# Patient Record
Sex: MALE | Race: WHITE | NOT HISPANIC OR LATINO | Employment: OTHER | URBAN - NONMETROPOLITAN AREA
[De-identification: names, ages, dates, MRNs, and addresses within clinical notes are randomized per-mention and may not be internally consistent; named-entity substitution may affect disease eponyms.]

---

## 2017-03-20 PROBLEM — N52.9 ERECTILE DYSFUNCTION: Status: ACTIVE | Noted: 2017-03-20

## 2017-06-05 ENCOUNTER — OFFICE VISIT (OUTPATIENT)
Dept: INTERNAL MEDICINE | Facility: CLINIC | Age: 62
End: 2017-06-05

## 2017-06-05 VITALS
OXYGEN SATURATION: 97 % | HEART RATE: 64 BPM | TEMPERATURE: 98.4 F | DIASTOLIC BLOOD PRESSURE: 86 MMHG | SYSTOLIC BLOOD PRESSURE: 140 MMHG | BODY MASS INDEX: 26.68 KG/M2 | WEIGHT: 197 LBS | HEIGHT: 72 IN

## 2017-06-05 DIAGNOSIS — Z00.00 ROUTINE GENERAL MEDICAL EXAMINATION AT A HEALTH CARE FACILITY: Primary | ICD-10-CM

## 2017-06-05 LAB
ALBUMIN SERPL-MCNC: 4.4 G/DL (ref 3.5–5)
ALBUMIN/GLOB SERPL: 1.6 G/DL (ref 1–2)
ALP SERPL-CCNC: 107 U/L (ref 38–126)
ALT SERPL-CCNC: <6 U/L (ref 13–69)
AST SERPL-CCNC: 36 U/L (ref 15–46)
BILIRUB SERPL-MCNC: 2.2 MG/DL (ref 0.2–1.3)
BUN SERPL-MCNC: 21 MG/DL (ref 7–20)
BUN/CREAT SERPL: 21 (ref 6.3–21.9)
CALCIUM SERPL-MCNC: 9.4 MG/DL (ref 8.4–10.2)
CHLORIDE SERPL-SCNC: 108 MMOL/L (ref 98–107)
CHOLEST SERPL-MCNC: 223 MG/DL (ref 0–199)
CO2 SERPL-SCNC: 25 MMOL/L (ref 26–30)
CREAT SERPL-MCNC: 1 MG/DL (ref 0.6–1.3)
GLOBULIN SER CALC-MCNC: 2.7 GM/DL
GLUCOSE SERPL-MCNC: 95 MG/DL (ref 74–98)
HDLC SERPL-MCNC: 41 MG/DL (ref 40–60)
LDLC SERPL CALC-MCNC: 159 MG/DL (ref 0–99)
POTASSIUM SERPL-SCNC: 4.4 MMOL/L (ref 3.5–5.1)
PROT SERPL-MCNC: 7.1 G/DL (ref 6.3–8.2)
SODIUM SERPL-SCNC: 144 MMOL/L (ref 137–145)
TRIGL SERPL-MCNC: 113 MG/DL
VLDLC SERPL CALC-MCNC: 22.6 MG/DL

## 2017-06-05 PROCEDURE — 99396 PREV VISIT EST AGE 40-64: CPT | Performed by: INTERNAL MEDICINE

## 2017-06-05 RX ORDER — LISINOPRIL 10 MG/1
10 TABLET ORAL DAILY
Qty: 90 TABLET | Refills: 3 | Status: SHIPPED | OUTPATIENT
Start: 2017-06-05 | End: 2018-05-14

## 2017-06-05 NOTE — PROGRESS NOTES
Chief Complaint   Patient presents with   • Follow-up     1 year, HLD, ED       Reji Corbin is a 61 y.o. male and is here for a comprehensive physical exam. The patient reports problems - htn.     History:  Erectile dysfunction    Do you take any herbs or supplements that were not prescribed by a doctor? no  Are you taking calcium supplements? no  Are you taking aspirin daily? yes      Health Habits:  Dental Exam. not up to date - no current plans  Eye Exam. up to date  Exercise: 4 times/week.  Current exercise activities include: yard work    Health Maintenance   Topic Date Due   • TDAP/TD VACCINES (1 - Tdap) 06/07/1976   • HEPATITIS C SCREENING  06/03/2016   • LIPID PANEL  06/03/2017   • INFLUENZA VACCINE  08/01/2017   • COLONOSCOPY  01/01/2021       PMH, PSH, SocHx, FamHx, Allergies, and Medications: Reviewed and updated in the Visit Navigator.     Allergies   Allergen Reactions   • Codeine Sulfate      No past medical history on file.  Past Surgical History:   Procedure Laterality Date   • KNEE SURGERY       Social History     Social History   • Marital status:      Spouse name: N/A   • Number of children: N/A   • Years of education: N/A     Occupational History   • Not on file.     Social History Main Topics   • Smoking status: Never Smoker   • Smokeless tobacco: Never Used   • Alcohol use Not on file   • Drug use: Not on file   • Sexual activity: Not on file     Other Topics Concern   • Not on file     Social History Narrative     No family history on file.    Review of Systems  Review of Systems   Constitutional: Negative.  Negative for activity change, appetite change, fatigue and fever.   HENT: Negative for congestion, ear discharge, ear pain and trouble swallowing.    Eyes: Negative for photophobia and visual disturbance.   Respiratory: Negative for cough and shortness of breath.    Cardiovascular: Negative for chest pain and palpitations.   Gastrointestinal: Negative for abdominal  "distention, abdominal pain, constipation, diarrhea, nausea and vomiting.   Endocrine: Negative.    Genitourinary: Positive for difficulty urinating. Negative for dysuria, hematuria and urgency.   Musculoskeletal: Negative for arthralgias, back pain, joint swelling and myalgias.   Skin: Negative for color change and rash.   Allergic/Immunologic: Negative.    Neurological: Negative for dizziness, weakness, light-headedness and headaches.   Hematological: Negative for adenopathy. Does not bruise/bleed easily.   Psychiatric/Behavioral: Negative for agitation, confusion and dysphoric mood. The patient is not nervous/anxious.        Vitals:    06/05/17 1008   BP: 140/86   Pulse: 64   Temp: 98.4 °F (36.9 °C)   SpO2: 97%       Objective   /86  Pulse 64  Temp 98.4 °F (36.9 °C)  Ht 72\" (182.9 cm)  Wt 197 lb (89.4 kg)  SpO2 97%  BMI 26.72 kg/m2    Physical Exam   Constitutional: He is oriented to person, place, and time. He appears well-developed and well-nourished. No distress.   HENT:   Nose: Nose normal.   Mouth/Throat: Oropharynx is clear and moist.   Eyes: Conjunctivae and EOM are normal. No scleral icterus.   Neck: No tracheal deviation present. No thyromegaly present.   Cardiovascular: Normal rate and regular rhythm.  Exam reveals no friction rub.    No murmur heard.  Pulmonary/Chest: No respiratory distress. He has no wheezes. He has no rales.   Abdominal: Soft. He exhibits no distension and no mass. There is no tenderness. There is no guarding.   Genitourinary: Rectum normal and prostate normal.   Musculoskeletal: Normal range of motion. He exhibits no deformity.   Lymphadenopathy:     He has no cervical adenopathy.   Neurological: He is alert and oriented to person, place, and time. He has normal reflexes. No cranial nerve deficit. Coordination normal.   Skin: Skin is warm and dry. No rash noted. No erythema.   Psychiatric: He has a normal mood and affect. His behavior is normal. Judgment and thought " content normal.        Assessment/Plan   1. Healthy male exam.  2. Patient Counseling: Including but not Limited to the following, when appropriate:  --Nutrition: Stressed importance of moderation in sodium/caffeine intake, saturated fat and cholesterol, caloric balance, sufficient intake of fresh fruits, vegetables, fiber, calcium, iron, and 1 mg of folate supplement per day (for females capable of pregnancy).  --Discussed the issue of   daily use of baby aspirin.  --Exercise: Stressed the importance of regular exercise.   --Substance Abuse: Discussed cessation/primary prevention of tobacco, alcohol, or other drug use; driving or other dangerous activities under the influence; availability of treatment for abuse, as indicated based on social history.    --Sexuality: Discussed sexually transmitted diseases, partner selection, use of condoms, avoidance of unintended pregnancy  and contraceptive alternatives.   --Injury prevention: Discussed safety belts, safety helmets, smoke detector, smoking near bedding or upholstery.   --Dental health: Discussed importance of regular tooth brushing, flossing, and dental visits.  --Immunizations reviewed.  --Discussed benefits of colon cancer screening.      3. Discussed the patient's BMI with him.  The BMI is in the acceptable range  4. No Follow-up on file.  5. Age-appropriate Screening Scheduled  6. There are no Patient Instructions on file for this visit.    Assessment/Plan     There are no diagnoses linked to this encounter.

## 2017-06-06 DIAGNOSIS — Z00.00 ROUTINE GENERAL MEDICAL EXAMINATION AT A HEALTH CARE FACILITY: ICD-10-CM

## 2017-06-08 ENCOUNTER — TELEPHONE (OUTPATIENT)
Dept: INTERNAL MEDICINE | Facility: CLINIC | Age: 62
End: 2017-06-08

## 2017-06-08 NOTE — TELEPHONE ENCOUNTER
----- Message from Johny Mallory MD sent at 6/7/2017 11:21 AM EDT -----  Please inform patient labs are okay.

## 2017-06-09 LAB
PSA SERPL-MCNC: 0.56 NG/ML (ref 0.06–4)
WRITTEN AUTHORIZATION: NORMAL

## 2017-11-02 ENCOUNTER — FLU SHOT (OUTPATIENT)
Dept: INTERNAL MEDICINE | Facility: CLINIC | Age: 62
End: 2017-11-02

## 2017-11-02 DIAGNOSIS — Z23 NEED FOR INFLUENZA VACCINE: ICD-10-CM

## 2017-11-02 PROCEDURE — 90471 IMMUNIZATION ADMIN: CPT | Performed by: INTERNAL MEDICINE

## 2017-11-02 PROCEDURE — 90686 IIV4 VACC NO PRSV 0.5 ML IM: CPT | Performed by: INTERNAL MEDICINE

## 2018-05-14 ENCOUNTER — OFFICE VISIT (OUTPATIENT)
Dept: SURGERY | Facility: CLINIC | Age: 63
End: 2018-05-14

## 2018-05-14 VITALS
OXYGEN SATURATION: 99 % | DIASTOLIC BLOOD PRESSURE: 100 MMHG | SYSTOLIC BLOOD PRESSURE: 168 MMHG | WEIGHT: 194 LBS | HEIGHT: 72 IN | TEMPERATURE: 98.1 F | HEART RATE: 68 BPM | BODY MASS INDEX: 26.28 KG/M2

## 2018-05-14 DIAGNOSIS — K40.90 RIGHT INGUINAL HERNIA: ICD-10-CM

## 2018-05-14 DIAGNOSIS — R10.31 RIGHT INGUINAL PAIN: ICD-10-CM

## 2018-05-14 DIAGNOSIS — IMO0002 MASS: Primary | ICD-10-CM

## 2018-05-14 PROCEDURE — 99244 OFF/OP CNSLTJ NEW/EST MOD 40: CPT | Performed by: SURGERY

## 2018-05-14 NOTE — PROGRESS NOTES
Initiate Treatment: Luxamend cream TID (Trade sized tube given)\\nBactrim DS BID for 10 days\\nHydroxyzine 10mg 1-2 po QHS (may cause sedation) Patient: Reji Corbin    YOB: 1955    Date: 05/14/2018    Primary Care Provider: Johny Mallory MD    Reason for Consultation: Hernia    Chief Complaint   Patient presents with   • Hernia       Subjective .     History of present illness:  I saw the patient in the office today as a consultation for evaluation and treatment of a bulge. He complains of a bulge in his right groin for the past 6 months has gradually gotten bigger. He states it worsens with movement. He complains of an sharp pain in his right groin, radiates into his left groin and worsens with touch. He denies diarrhea and constipation.     The following portions of the patient's history were reviewed and updated as appropriate: allergies, current medications, past family history, past medical history, past social history, past surgical history and problem list.    Review of Systems   Constitutional: Negative for chills, fever and unexpected weight change.   HENT: Negative for trouble swallowing and voice change.    Eyes: Negative for visual disturbance.   Respiratory: Negative for apnea, cough, chest tightness, shortness of breath and wheezing.    Cardiovascular: Negative for chest pain, palpitations and leg swelling.   Gastrointestinal: Negative for abdominal distention, abdominal pain, anal bleeding, blood in stool, constipation, diarrhea, nausea, rectal pain and vomiting.   Endocrine: Negative for cold intolerance and heat intolerance.   Genitourinary: Negative for difficulty urinating, dysuria, flank pain, scrotal swelling and testicular pain.   Musculoskeletal: Negative for back pain, gait problem and joint swelling.   Skin: Negative for color change, rash and wound.        mass   Neurological: Negative for dizziness, syncope, speech difficulty, weakness, numbness and headaches.   Hematological: Negative for adenopathy. Does not bruise/bleed easily.   Psychiatric/Behavioral: Negative for confusion. The patient is not  Detail Level: Simple nervous/anxious.        History:  Past Medical History:   Diagnosis Date   • Hypertension        Past Surgical History:   Procedure Laterality Date   • KNEE SURGERY         Family History   Problem Relation Age of Onset   • No Known Problems Mother    • Stroke Father        Social History   Substance Use Topics   • Smoking status: Never Smoker   • Smokeless tobacco: Never Used   • Alcohol use No       Allergies:  Allergies   Allergen Reactions   • Codeine Sulfate Irritability       Medications:  No current outpatient prescriptions on file.    Objective     Vital Signs:        Physical Exam:   General Appearance:    Alert, cooperative, in no acute distress   Head:    Normocephalic, without obvious abnormality, atraumatic   Eyes:            Lids and lashes normal, conjunctivae and sclerae normal, no   icterus, no pallor, corneas clear, PERRLA   Ears:    Ears appear intact with no abnormalities noted   Throat:   No oral lesions, no thrush, oral mucosa moist   Neck:   No adenopathy, supple, trachea midline, no thyromegaly, no   carotid bruit, no JVD   Lungs:     Clear to auscultation,respirations regular, even and                  unlabored    Heart:    Regular rhythm and normal rate, normal S1 and S2, no            murmur   Abdomen:     no masses, no organomegaly, soft non-tender, non-distended, no guarding, there is evidence of a large reducible right inguinal hernia   Extremities:   Moves all extremities well, no edema, no cyanosis, no             redness   Pulses:   Pulses palpable and equal bilaterally   Skin:   No bleeding, bruising or rash   Lymph nodes:   No palpable adenopathy   Neurologic:   Cranial nerves 2 - 12 grossly intact, sensation intact        Results Review:   I reviewed the patient's new clinical results.  I reviewed the patient's new imaging results and agree with the interpretation.  I reviewed the patient's other test results and agree with the interpretation    Review of Systems was reviewed and  confirmed as accurate today.    Assessment/Plan :    1. Mass    2. Right inguinal pain    3. Right inguinal hernia        I had a detailed and extensive discussion with the patient in the office and they understand that they need to undergo hernia repair with mesh.  Full risks and benefits of operative versus nonoperative intervention were discussed with the patient and these included things such as nonresolution of symptoms and possible worsening of symptoms without surgical intervention versus infection, bleeding, possible recurrent hernia, possible postoperative neuralgia from nerve damage or involvement with scar tissue, etc.  The patient understands, agrees, and had no questions for me at the end of the office visit.     I discussed the patients findings and my recommendations with patient. I am going to see him again next week to check his blood pressure since I have asked him to restart his medications.    Electronically signed by Leonard Jenkins MD  05/16/18    Scribed for Leonard Jenkins MD by Myrna Chiang. 5/16/2018  8:27 AM           Discontinue Regimen: Stop scratching

## 2018-05-21 ENCOUNTER — OFFICE VISIT (OUTPATIENT)
Dept: SURGERY | Facility: CLINIC | Age: 63
End: 2018-05-21

## 2018-05-21 VITALS
WEIGHT: 193 LBS | SYSTOLIC BLOOD PRESSURE: 150 MMHG | BODY MASS INDEX: 26.14 KG/M2 | HEIGHT: 72 IN | TEMPERATURE: 98.8 F | DIASTOLIC BLOOD PRESSURE: 100 MMHG | HEART RATE: 72 BPM | OXYGEN SATURATION: 99 %

## 2018-05-21 DIAGNOSIS — Z12.11 COLON CANCER SCREENING: ICD-10-CM

## 2018-05-21 DIAGNOSIS — K40.90 RIGHT INGUINAL HERNIA: Primary | ICD-10-CM

## 2018-05-21 DIAGNOSIS — I10 HYPERTENSION, UNSPECIFIED TYPE: ICD-10-CM

## 2018-05-21 PROCEDURE — 99213 OFFICE O/P EST LOW 20 MIN: CPT | Performed by: SURGERY

## 2018-05-21 RX ORDER — LISINOPRIL 10 MG/1
20 TABLET ORAL 2 TIMES DAILY
COMMUNITY
End: 2022-06-13 | Stop reason: SDUPTHER

## 2018-05-21 NOTE — PROGRESS NOTES
Patient: Reji Corbin    YOB: 1955    Date: 05/21/2018    Primary Care Provider: Johny Mallory MD    Chief Complaint   Patient presents with   • Colonoscopy     follow up BP       Subjective .     History of present illness:  I saw the patient in the office today as a follow up on hypertension.  Patient had a colonoscopy about 8 years ago.  He denies changes in bowel habits, rectal bleeding or fm hx of colon cancer.  Patient will be scheduled for a RIH repair after the colonoscopy. He does have a history of increased blood pressure and has restarted his previous antihypertensive medications.    The following portions of the patient's history were reviewed and updated as appropriate: allergies, current medications, past family history, past medical history, past social history, past surgical history and problem list.      Review of Systems   Constitutional: Negative for chills, diaphoresis, fatigue and fever.   HENT: Negative for dental problem, drooling, ear discharge and hearing loss.    Respiratory: Negative for cough, choking, chest tightness and shortness of breath.    Cardiovascular: Negative for chest pain, palpitations and leg swelling.   Gastrointestinal: Negative for blood in stool.   Endocrine: Negative for cold intolerance and heat intolerance.   Genitourinary: Negative for flank pain, frequency and hematuria.   Neurological: Negative for seizures, light-headedness, numbness and headaches.   Psychiatric/Behavioral: Negative for agitation, behavioral problems and confusion.       History:  Past Medical History:   Diagnosis Date   • Hypertension        Past Surgical History:   Procedure Laterality Date   • KNEE SURGERY         Family History   Problem Relation Age of Onset   • No Known Problems Mother    • Stroke Father        Social History   Substance Use Topics   • Smoking status: Never Smoker   • Smokeless tobacco: Never Used   • Alcohol use No       Allergies:  Allergies   Allergen  "Reactions   • Codeine Sulfate Irritability       Medications:    Current Outpatient Prescriptions:   •  lisinopril (PRINIVIL,ZESTRIL) 10 MG tablet, Take 10 mg by mouth Daily., Disp: , Rfl:     Objective     Vital Signs:   Vitals:    05/21/18 1016   BP: 150/100   BP Location: Left arm   Patient Position: Sitting   Pulse: 72   Temp: 98.8 °F (37.1 °C)   TempSrc: Temporal Artery    SpO2: 99%   Weight: 87.5 kg (193 lb)   Height: 182.9 cm (72.01\")       Physical Exam:   General Appearance:    Alert, cooperative, in no acute distress   Head:    Normocephalic, without obvious abnormality, atraumatic   Eyes:            Lids and lashes normal, conjunctivae and sclerae normal, no   icterus, no pallor, corneas clear, PERRL   Ears:    Ears appear intact with no abnormalities noted   Throat:   No oral lesions, no thrush, oral mucosa moist   Neck:   No adenopathy, supple, trachea midline, no thyromegaly,  no JVD   Lungs:     Clear to auscultation,respirations regular, even and                  unlabored    Heart:    Regular rhythm and normal rate, normal S1 and S2, no            murmur   Abdomen:     no masses, no organomegaly, soft non-tender, non-distended, no guarding, there is no evidence of tenderness   Extremities:   Moves all extremities well, no edema, no cyanosis, no             redness   Pulses:   Pulses palpable and equal bilaterally   Skin:   No bleeding, bruising or rash   Lymph nodes:   No palpable adenopathy   Neurologic:   Cranial nerves 2 - 12 grossly intact, sensation intact      Results Review:   I reviewed the patient's new clinical results.  I reviewed the patient's new imaging results and agree with the interpretation.  I reviewed the patient's other test results and agree with the interpretation    Review of Systems was reviewed and confirmed as accurate today.    Assessment/Plan :    1. Right inguinal hernia    2. Colon cancer screening    3. Hypertension, unspecified type      I did have a clear and " detailed discussion with the patient in the office today.  He needs to undergo inguinal herniorrhaphy but first needs to have better control of his blood pressure.  I have referred him back to his primary care physician Dr. Johny Mallory.  He also understands that ultimately he will need to undergo screening colonoscopy.  I will see him back on his blood pressure is under better control.    Electronically signed by Leonard Jenkins MD  05/23/18 10:18 AM    Scribed for Leonard Jenkins MD by Katy Irizarry. 5/23/2018  3:10 PM

## 2018-07-25 ENCOUNTER — OFFICE VISIT (OUTPATIENT)
Dept: SURGERY | Facility: CLINIC | Age: 63
End: 2018-07-25

## 2018-07-25 VITALS
WEIGHT: 191 LBS | BODY MASS INDEX: 25.87 KG/M2 | TEMPERATURE: 98.2 F | SYSTOLIC BLOOD PRESSURE: 118 MMHG | HEART RATE: 80 BPM | OXYGEN SATURATION: 98 % | HEIGHT: 72 IN | DIASTOLIC BLOOD PRESSURE: 80 MMHG

## 2018-07-25 DIAGNOSIS — K40.90 NON-RECURRENT UNILATERAL INGUINAL HERNIA WITHOUT OBSTRUCTION OR GANGRENE: Primary | ICD-10-CM

## 2018-07-25 DIAGNOSIS — Z12.11 SCREENING FOR COLON CANCER: ICD-10-CM

## 2018-07-25 PROCEDURE — 99213 OFFICE O/P EST LOW 20 MIN: CPT | Performed by: SURGERY

## 2018-07-25 RX ORDER — BISACODYL 5 MG/1
10 TABLET, DELAYED RELEASE ORAL 2 TIMES DAILY
Qty: 4 TABLET | Refills: 0 | Status: SHIPPED | OUTPATIENT
Start: 2018-07-25 | End: 2018-07-26

## 2018-07-25 RX ORDER — POLYETHYLENE GLYCOL 1450
1 POWDER (GRAM) MISCELLANEOUS
Qty: 238 G | Refills: 0 | Status: SHIPPED | OUTPATIENT
Start: 2018-07-25 | End: 2018-07-25

## 2018-07-25 NOTE — PROGRESS NOTES
Patient: Reji Corbin    YOB: 1955    Date: 07/25/2018    Primary Care Provider: Johny Mallory MD    Chief Complaint   Patient presents with   • Follow-up     right inguinal pain       Subjective .     History of present illness:  I saw the patient in the office today as a consultation for evaluation and treatment of right inguinal pain.  Patient is here today to discuss a colonoscopy/RIH repair.  Patient was to get his blood pressure under control.  He does need to undergo a screening colonoscopy, he does complain of sharp right inguinal discomfort, associated with the mass, present for several months, worse with heavy lifting, relieved by lying down, nonradiating in nature.  He did have his blood pressure addressed recently and his hypertension is markedly better after increasing his level of medications.    The following portions of the patient's history were reviewed and updated as appropriate: allergies, current medications, past family history, past medical history, past social history, past surgical history and problem list.      Review of Systems   Constitutional: Negative for chills, diaphoresis, fatigue and fever.   HENT: Negative for dental problem, drooling, ear discharge and hearing loss.    Respiratory: Negative for cough, choking, chest tightness and shortness of breath.    Cardiovascular: Negative for chest pain, palpitations and leg swelling.   Endocrine: Negative for cold intolerance and heat intolerance.   Genitourinary: Negative for flank pain, frequency and hematuria.   Neurological: Negative for seizures, light-headedness, numbness and headaches.   Psychiatric/Behavioral: Negative for agitation, behavioral problems and confusion.       History:  Past Medical History:   Diagnosis Date   • Hypertension        Past Surgical History:   Procedure Laterality Date   • KNEE SURGERY         Family History   Problem Relation Age of Onset   • No Known Problems Mother    • Stroke  "Father        Social History   Substance Use Topics   • Smoking status: Never Smoker   • Smokeless tobacco: Never Used   • Alcohol use No       Allergies:  Allergies   Allergen Reactions   • Codeine Sulfate Irritability       Medications:    Current Outpatient Prescriptions:   •  bisacodyl (DULCOLAX) 5 MG EC tablet, Take 2 tablets by mouth 2 (Two) Times a Day for 1 day., Disp: 4 tablet, Rfl: 0  •  lisinopril (PRINIVIL,ZESTRIL) 10 MG tablet, Take 10 mg by mouth Daily., Disp: , Rfl:   •  Polyethylene Glycol powder, 1 bottle 1 (One) Time for 1 dose. To be used for bowel prep., Disp: 238 g, Rfl: 0    Objective     Vital Signs:   Vitals:    07/25/18 1252   BP: 118/80   Pulse: 80   Temp: 98.2 °F (36.8 °C)   TempSrc: Temporal Artery    SpO2: 98%   Weight: 86.6 kg (191 lb)   Height: 182.9 cm (72.01\")       Physical Exam:   General Appearance:    Alert, cooperative, in no acute distress   Head:    Normocephalic, without obvious abnormality, atraumatic   Eyes:            Lids and lashes normal, conjunctivae and sclerae normal, no   icterus, no pallor, corneas clear, PERRL   Ears:    Ears appear intact with no abnormalities noted   Throat:   No oral lesions, no thrush, oral mucosa moist   Neck:   No adenopathy, supple, trachea midline, no thyromegaly,  no JVD   Lungs:     Clear to auscultation,respirations regular, even and                  unlabored    Heart:    Regular rhythm and normal rate, normal S1 and S2, no            murmur   Abdomen:     no masses, no organomegaly, soft non-tender, non-distended, no guarding, there is no evidence of tenderness   Extremities:   Moves all extremities well, no edema, no cyanosis, no             redness   Pulses:   Pulses palpable and equal bilaterally   Skin:   No bleeding, bruising or rash   Lymph nodes:   No palpable adenopathy   Neurologic:   Cranial nerves 2 - 12 grossly intact, sensation intact      Results Review:   I reviewed the patient's new clinical results.  I reviewed the " patient's new imaging results and agree with the interpretation.  I reviewed the patient's other test results and agree with the interpretation    Review of Systems was reviewed and confirmed as accurate today.    Assessment/Plan :    1. Non-recurrent unilateral inguinal hernia without obstruction or gangrene    2. Screening for colon cancer        I recommend a colonoscopy for further evaluation. The procedure was explained as well as the risks which include but are not limited to bleeding, infection, perforation, abdominal pain etc. The patient understands these risks and the procedure and wishes to proceed.  He then needs to undergo right inguinal herniorrhaphy.    Electronically signed by Leonard Jenkins MD  07/25/18 10:12 AM

## 2018-07-26 ENCOUNTER — PREP FOR SURGERY (OUTPATIENT)
Dept: OTHER | Facility: HOSPITAL | Age: 63
End: 2018-07-26

## 2018-07-26 DIAGNOSIS — Z12.11 SCREEN FOR COLON CANCER: Primary | ICD-10-CM

## 2018-07-31 PROBLEM — Z12.11 SCREEN FOR COLON CANCER: Status: ACTIVE | Noted: 2018-07-31

## 2018-08-07 ENCOUNTER — TELEPHONE (OUTPATIENT)
Dept: SURGERY | Facility: CLINIC | Age: 63
End: 2018-08-07

## 2018-08-07 RX ORDER — BISACODYL 5 MG/1
5 TABLET, DELAYED RELEASE ORAL
COMMUNITY
Start: 2018-08-08 | End: 2018-08-09

## 2018-08-07 RX ORDER — MULTIPLE VITAMINS W/ MINERALS TAB 9MG-400MCG
1 TAB ORAL DAILY
COMMUNITY
End: 2022-08-22

## 2018-08-07 NOTE — TELEPHONE ENCOUNTER
Left message confirming colonoscopy scheduled 08/09/18 @ [facility](pts wife) called back and confirmed

## 2018-08-09 ENCOUNTER — ANESTHESIA EVENT (OUTPATIENT)
Dept: GASTROENTEROLOGY | Facility: HOSPITAL | Age: 63
End: 2018-08-09

## 2018-08-09 ENCOUNTER — HOSPITAL ENCOUNTER (OUTPATIENT)
Facility: HOSPITAL | Age: 63
Setting detail: HOSPITAL OUTPATIENT SURGERY
Discharge: HOME | End: 2018-08-09
Attending: SURGERY | Admitting: SURGERY

## 2018-08-09 ENCOUNTER — ANESTHESIA (OUTPATIENT)
Dept: GASTROENTEROLOGY | Facility: HOSPITAL | Age: 63
End: 2018-08-09

## 2018-08-09 VITALS
HEART RATE: 58 BPM | BODY MASS INDEX: 25.06 KG/M2 | RESPIRATION RATE: 18 BRPM | DIASTOLIC BLOOD PRESSURE: 80 MMHG | WEIGHT: 185 LBS | HEIGHT: 72 IN | TEMPERATURE: 97.5 F | SYSTOLIC BLOOD PRESSURE: 131 MMHG | OXYGEN SATURATION: 98 %

## 2018-08-09 DIAGNOSIS — Z12.11 SCREEN FOR COLON CANCER: ICD-10-CM

## 2018-08-09 PROCEDURE — 25010000002 PROPOFOL 10 MG/ML EMULSION: Performed by: NURSE ANESTHETIST, CERTIFIED REGISTERED

## 2018-08-09 PROCEDURE — 25010000002 ONDANSETRON PER 1 MG: Performed by: NURSE ANESTHETIST, CERTIFIED REGISTERED

## 2018-08-09 RX ORDER — LIDOCAINE HYDROCHLORIDE 20 MG/ML
INJECTION, SOLUTION INFILTRATION; PERINEURAL AS NEEDED
Status: DISCONTINUED | OUTPATIENT
Start: 2018-08-09 | End: 2018-08-09 | Stop reason: SURG

## 2018-08-09 RX ORDER — ASPIRIN 81 MG/1
81 TABLET ORAL DAILY
COMMUNITY
End: 2022-07-07

## 2018-08-09 RX ORDER — SODIUM CHLORIDE, SODIUM LACTATE, POTASSIUM CHLORIDE, CALCIUM CHLORIDE 600; 310; 30; 20 MG/100ML; MG/100ML; MG/100ML; MG/100ML
1000 INJECTION, SOLUTION INTRAVENOUS CONTINUOUS
Status: DISCONTINUED | OUTPATIENT
Start: 2018-08-09 | End: 2018-08-09 | Stop reason: HOSPADM

## 2018-08-09 RX ORDER — ONDANSETRON 2 MG/ML
INJECTION INTRAMUSCULAR; INTRAVENOUS AS NEEDED
Status: DISCONTINUED | OUTPATIENT
Start: 2018-08-09 | End: 2018-08-09 | Stop reason: SURG

## 2018-08-09 RX ORDER — PROPOFOL 10 MG/ML
VIAL (ML) INTRAVENOUS AS NEEDED
Status: DISCONTINUED | OUTPATIENT
Start: 2018-08-09 | End: 2018-08-09 | Stop reason: SURG

## 2018-08-09 RX ADMIN — ONDANSETRON 4 MG: 2 INJECTION INTRAMUSCULAR; INTRAVENOUS at 10:31

## 2018-08-09 RX ADMIN — PROPOFOL 40 MG: 10 INJECTION, EMULSION INTRAVENOUS at 10:35

## 2018-08-09 RX ADMIN — PROPOFOL 60 MG: 10 INJECTION, EMULSION INTRAVENOUS at 10:31

## 2018-08-09 RX ADMIN — LIDOCAINE HYDROCHLORIDE 80 MG: 20 INJECTION, SOLUTION INFILTRATION; PERINEURAL at 10:31

## 2018-08-09 RX ADMIN — SODIUM CHLORIDE, POTASSIUM CHLORIDE, SODIUM LACTATE AND CALCIUM CHLORIDE: 600; 310; 30; 20 INJECTION, SOLUTION INTRAVENOUS at 10:29

## 2018-08-09 NOTE — ANESTHESIA PREPROCEDURE EVALUATION
Anesthesia Evaluation     Patient summary reviewed and Nursing notes reviewed   no history of anesthetic complications:  NPO Solid Status: > 8 hours  NPO Liquid Status: > 8 hours           Airway   Mallampati: I  TM distance: >3 FB  Neck ROM: full  no difficulty expected  Dental - normal exam     Pulmonary - negative pulmonary ROS and normal exam   Cardiovascular - normal exam    PT is on anticoagulation therapy  Rhythm: regular  Rate: normal    (+) hypertension, hyperlipidemia,       Neuro/Psych- negative ROS  GI/Hepatic/Renal/Endo - negative ROS     Musculoskeletal     (+) back pain,   Abdominal  - normal exam    Abdomen: soft.  Bowel sounds: normal.   Substance History - negative use     OB/GYN negative ob/gyn ROS         Other   (+) arthritis                     Anesthesia Plan    ASA 2     MAC   (Risks and benefits discussed including risk of aspiration, recall and dental damage. All patient questions answered. Will continue with POC.)  intravenous induction   Anesthetic plan and risks discussed with patient.

## 2018-08-09 NOTE — ANESTHESIA POSTPROCEDURE EVALUATION
Patient: Reji Corbin    Procedure Summary     Date:  08/09/18 Room / Location:  [facility] ENDOSCOPY 3 / [facility] ENDOSCOPY    Anesthesia Start:  1029 Anesthesia Stop:      Procedure:  COLONOSCOPY WITH HOT FORCEP POLYPECTOMY (N/A ) Diagnosis:       Screen for colon cancer      (Screen for colon cancer [Z12.11])    Surgeon:  Leonard Jenkins MD Provider:  Mao Marcos CRNA    Anesthesia Type:  MAC ASA Status:  2          Anesthesia Type: MAC  Last vitals  BP   104/57   Temp   97   Pulse   59   Resp   20   SpO2   98     Post Anesthesia Care and Evaluation    Patient location during evaluation: bedside  Patient participation: complete - patient participated  Level of consciousness: awake and alert  Pain score: 0  Pain management: adequate  Airway patency: patent  Anesthetic complications: No anesthetic complications  PONV Status: none  Cardiovascular status: acceptable  Respiratory status: acceptable and nasal cannula  Hydration status: acceptable

## 2018-08-14 LAB
LAB AP CASE REPORT: NORMAL
PATH REPORT.FINAL DX SPEC: NORMAL

## 2018-08-15 ENCOUNTER — OFFICE VISIT (OUTPATIENT)
Dept: SURGERY | Facility: CLINIC | Age: 63
End: 2018-08-15

## 2018-08-15 VITALS
TEMPERATURE: 97.8 F | WEIGHT: 185 LBS | HEART RATE: 62 BPM | SYSTOLIC BLOOD PRESSURE: 130 MMHG | DIASTOLIC BLOOD PRESSURE: 88 MMHG | HEIGHT: 72 IN | BODY MASS INDEX: 25.06 KG/M2 | OXYGEN SATURATION: 99 %

## 2018-08-15 DIAGNOSIS — K63.5 HYPERPLASTIC POLYP OF SIGMOID COLON: Primary | ICD-10-CM

## 2018-08-15 DIAGNOSIS — R10.31 RIGHT LOWER QUADRANT ABDOMINAL PAIN: ICD-10-CM

## 2018-08-15 DIAGNOSIS — K40.90 RIGHT INGUINAL HERNIA: ICD-10-CM

## 2018-08-15 PROCEDURE — 99213 OFFICE O/P EST LOW 20 MIN: CPT | Performed by: SURGERY

## 2018-08-15 NOTE — PROGRESS NOTES
Patient: Reji Corbin    YOB: 1955    Date: 08/15/2018    Primary Care Provider: Magy Wheatley MD    Reason for Consultation: Hernia    Chief Complaint   Patient presents with   • Follow-up       Subjective .     History of present illness:  I saw the patient in the office today as a consultation for a follow up colonoscopy, pathology showed hyperplastic changes and mucosal prolapse related changes.  Patient also has a right inguinal hernia noted on ultrasound, patient will be scheduled accordingly. He complains of right inguinal tenderness.     The patient does have right inguinal discomfort, he states that he does have a mass located in the right scrotal region, this is been present for several months, this is increase is size with heavy lifting, relieved by lying down, nonradiating in nature, not associated with any other symptomatology.    The following portions of the patient's history were reviewed and updated as appropriate: allergies, current medications, past family history, past medical history, past social history, past surgical history and problem list.    Review of Systems   Constitutional: Negative for chills, fever and unexpected weight change.   HENT: Negative for trouble swallowing and voice change.    Eyes: Negative for visual disturbance.   Respiratory: Negative for apnea, cough, chest tightness, shortness of breath and wheezing.    Cardiovascular: Negative for chest pain, palpitations and leg swelling.   Gastrointestinal: Negative for abdominal distention, abdominal pain (right inguinal pain), anal bleeding, blood in stool, constipation, diarrhea, nausea, rectal pain and vomiting.   Endocrine: Negative for cold intolerance and heat intolerance.   Genitourinary: Negative for difficulty urinating, dysuria, flank pain, scrotal swelling and testicular pain.   Musculoskeletal: Negative for back pain, gait problem and joint swelling.   Skin: Negative for color change,  "rash and wound.   Neurological: Negative for dizziness, syncope, speech difficulty, weakness, numbness and headaches.   Hematological: Negative for adenopathy. Does not bruise/bleed easily.   Psychiatric/Behavioral: Negative for confusion. The patient is not nervous/anxious.        History:  Past Medical History:   Diagnosis Date   • Arthritis    • Back pain     herniated disks   • Hard of hearing    • History of stress test 2000    normal   • Hypertension        Past Surgical History:   Procedure Laterality Date   • COLONOSCOPY      2013   • COLONOSCOPY N/A 8/9/2018    Procedure: COLONOSCOPY WITH HOT FORCEP POLYPECTOMY;  Surgeon: Leonard Jenkins MD;  Location: [facility] ENDOSCOPY;  Service: Gastroenterology   • KNEE SURGERY         Family History   Problem Relation Age of Onset   • No Known Problems Mother    • Stroke Father        Social History   Substance Use Topics   • Smoking status: Never Smoker   • Smokeless tobacco: Never Used   • Alcohol use No       Allergies:  Allergies   Allergen Reactions   • Codeine Sulfate Irritability       Medications:    Current Outpatient Prescriptions:   •  aspirin 81 MG EC tablet, Take 81 mg by mouth Daily., Disp: , Rfl:   •  lisinopril (PRINIVIL,ZESTRIL) 10 MG tablet, Take 20 mg by mouth 2 (Two) Times a Day. Takes 20mg of the morning and 20mg at night, Disp: , Rfl:   •  Multiple Vitamins-Minerals (MULTIVITAMIN WITH MINERALS) tablet tablet, Take 1 tablet by mouth Daily., Disp: , Rfl:     Objective     Vital Signs:   Vitals:    08/15/18 1444   BP: 130/88   Pulse: 62   Temp: 97.8 °F (36.6 °C)   TempSrc: Temporal Artery    SpO2: 99%   Weight: 83.9 kg (185 lb)   Height: 182.9 cm (72\")       Physical Exam:   General Appearance:    Alert, cooperative, in no acute distress   Head:    Normocephalic, without obvious abnormality, atraumatic   Eyes:            Lids and lashes normal, conjunctivae and sclerae normal, no   icterus, no pallor, corneas clear, PERRLA   Ears:    Ears appear " intact with no abnormalities noted   Throat:   No oral lesions, no thrush, oral mucosa moist   Neck:   No adenopathy, supple, trachea midline, no thyromegaly, no   carotid bruit, no JVD   Lungs:     Clear to auscultation,respirations regular, even and                  unlabored    Heart:    Regular rhythm and normal rate, normal S1 and S2, no            murmur   Abdomen:     no masses, no organomegaly, soft non-tender, non-distended, no guarding, there is evidence of a large incarcerated right inguinal hernia that does extend into the scrotum    Extremities:   Moves all extremities well, no edema, no cyanosis, no             redness   Pulses:   Pulses palpable and equal bilaterally   Skin:   No bleeding, bruising or rash   Lymph nodes:   No palpable adenopathy   Neurologic:   Cranial nerves 2 - 12 grossly intact, sensation intact        Results Review:   I reviewed the patient's new clinical results.  I reviewed the patient's new imaging results and agree with the interpretation.  I reviewed the patient's other test results and agree with the interpretation    Review of Systems was reviewed and confirmed as accurate today.    Assessment/Plan :    1. Hyperplastic polyp of sigmoid colon    2. Right lower quadrant abdominal pain    3. Right inguinal hernia        I had a detailed and extensive discussion with the patient in the office and they understand that they need to undergo hernia repair with mesh.  Full risks and benefits of operative versus nonoperative intervention were discussed with the patient and these included things such as nonresolution of symptoms and possible worsening of symptoms without surgical intervention versus infection, bleeding, possible recurrent hernia, possible postoperative neuralgia from nerve damage or involvement with scar tissue, etc.  The patient understands, agrees, and had no questions for me at the end of the office visit.     I discussed the patients findings and my  recommendations with patient.    Electronically signed by Leonard Jenkins MD  08/15/18      Portions of this note have been scribed for Leonard Jnekins MD by Katy Irizarry. 8/15/2018  3:13 PM

## 2018-08-17 PROBLEM — K40.90 RIGHT INGUINAL HERNIA: Status: ACTIVE | Noted: 2018-08-17

## 2018-09-04 ENCOUNTER — APPOINTMENT (OUTPATIENT)
Dept: PREADMISSION TESTING | Facility: HOSPITAL | Age: 63
End: 2018-09-04

## 2018-09-04 VITALS — WEIGHT: 187 LBS | BODY MASS INDEX: 25.33 KG/M2 | HEIGHT: 72 IN

## 2018-09-04 LAB
ANION GAP SERPL CALCULATED.3IONS-SCNC: 13.4 MMOL/L (ref 10–20)
BUN BLD-MCNC: 22 MG/DL (ref 7–20)
BUN/CREAT SERPL: 22 (ref 6.3–21.9)
CALCIUM SPEC-SCNC: 9.6 MG/DL (ref 8.4–10.2)
CHLORIDE SERPL-SCNC: 107 MMOL/L (ref 98–107)
CO2 SERPL-SCNC: 27 MMOL/L (ref 26–30)
CREAT BLD-MCNC: 1 MG/DL (ref 0.6–1.3)
DEPRECATED RDW RBC AUTO: 40.2 FL (ref 37–54)
ERYTHROCYTE [DISTWIDTH] IN BLOOD BY AUTOMATED COUNT: 12.6 % (ref 11.5–14.5)
GFR SERPL CREATININE-BSD FRML MDRD: 76 ML/MIN/1.73
GLUCOSE BLD-MCNC: 93 MG/DL (ref 74–98)
HCT VFR BLD AUTO: 45.8 % (ref 42–52)
HGB BLD-MCNC: 15.9 G/DL (ref 14–18)
MCH RBC QN AUTO: 30.4 PG (ref 27–31)
MCHC RBC AUTO-ENTMCNC: 34.7 G/DL (ref 30–37)
MCV RBC AUTO: 87.6 FL (ref 80–94)
PLATELET # BLD AUTO: 147 10*3/MM3 (ref 130–400)
PMV BLD AUTO: 11.1 FL (ref 6–12)
POTASSIUM BLD-SCNC: 4.4 MMOL/L (ref 3.5–5.1)
RBC # BLD AUTO: 5.23 10*6/MM3 (ref 4.7–6.1)
SODIUM BLD-SCNC: 143 MMOL/L (ref 137–145)
WBC NRBC COR # BLD: 5.84 10*3/MM3 (ref 4.8–10.8)

## 2018-09-04 PROCEDURE — 80048 BASIC METABOLIC PNL TOTAL CA: CPT | Performed by: SURGERY

## 2018-09-04 PROCEDURE — 36415 COLL VENOUS BLD VENIPUNCTURE: CPT

## 2018-09-04 PROCEDURE — 85027 COMPLETE CBC AUTOMATED: CPT | Performed by: SURGERY

## 2018-09-10 ENCOUNTER — ANESTHESIA EVENT (OUTPATIENT)
Dept: PERIOP | Facility: HOSPITAL | Age: 63
End: 2018-09-10

## 2018-09-11 ENCOUNTER — HOSPITAL ENCOUNTER (OUTPATIENT)
Facility: HOSPITAL | Age: 63
Setting detail: HOSPITAL OUTPATIENT SURGERY
Discharge: HOME | End: 2018-09-11
Attending: SURGERY | Admitting: SURGERY

## 2018-09-11 ENCOUNTER — ANESTHESIA (OUTPATIENT)
Dept: PERIOP | Facility: HOSPITAL | Age: 63
End: 2018-09-11

## 2018-09-11 VITALS
RESPIRATION RATE: 16 BRPM | SYSTOLIC BLOOD PRESSURE: 164 MMHG | OXYGEN SATURATION: 99 % | TEMPERATURE: 97.1 F | HEART RATE: 78 BPM | DIASTOLIC BLOOD PRESSURE: 91 MMHG

## 2018-09-11 DIAGNOSIS — K40.90 RIGHT INGUINAL HERNIA: ICD-10-CM

## 2018-09-11 PROCEDURE — 25010000002 ONDANSETRON PER 1 MG: Performed by: NURSE ANESTHETIST, CERTIFIED REGISTERED

## 2018-09-11 PROCEDURE — 25010000002 PROPOFOL 200 MG/20ML EMULSION: Performed by: NURSE ANESTHETIST, CERTIFIED REGISTERED

## 2018-09-11 PROCEDURE — 25010000002 ONDANSETRON PER 1 MG

## 2018-09-11 PROCEDURE — 25010000002 DEXAMETHASONE PER 1 MG: Performed by: NURSE ANESTHETIST, CERTIFIED REGISTERED

## 2018-09-11 PROCEDURE — 25010000002 PROMETHAZINE PER 50 MG: Performed by: NURSE ANESTHETIST, CERTIFIED REGISTERED

## 2018-09-11 PROCEDURE — 49505 PRP I/HERN INIT REDUC >5 YR: CPT | Performed by: SURGERY

## 2018-09-11 PROCEDURE — C1781 MESH (IMPLANTABLE): HCPCS | Performed by: SURGERY

## 2018-09-11 PROCEDURE — 25010000002 FENTANYL CITRATE (PF) 250 MCG/5ML SOLUTION: Performed by: NURSE ANESTHETIST, CERTIFIED REGISTERED

## 2018-09-11 PROCEDURE — 25010000002 MIDAZOLAM PER 1 MG: Performed by: NURSE ANESTHETIST, CERTIFIED REGISTERED

## 2018-09-11 PROCEDURE — 25010000002 KETOROLAC TROMETHAMINE PER 15 MG: Performed by: NURSE ANESTHETIST, CERTIFIED REGISTERED

## 2018-09-11 DEVICE — MESH PROLN 3X6: Type: IMPLANTABLE DEVICE | Site: ABDOMEN | Status: FUNCTIONAL

## 2018-09-11 RX ORDER — PROMETHAZINE HYDROCHLORIDE 25 MG/ML
12.5 INJECTION, SOLUTION INTRAMUSCULAR; INTRAVENOUS ONCE AS NEEDED
Status: DISCONTINUED | OUTPATIENT
Start: 2018-09-11 | End: 2018-09-11 | Stop reason: HOSPADM

## 2018-09-11 RX ORDER — PROPOFOL 10 MG/ML
INJECTION, EMULSION INTRAVENOUS AS NEEDED
Status: DISCONTINUED | OUTPATIENT
Start: 2018-09-11 | End: 2018-09-11 | Stop reason: SURG

## 2018-09-11 RX ORDER — DEXAMETHASONE SODIUM PHOSPHATE 4 MG/ML
INJECTION, SOLUTION INTRA-ARTICULAR; INTRALESIONAL; INTRAMUSCULAR; INTRAVENOUS; SOFT TISSUE AS NEEDED
Status: DISCONTINUED | OUTPATIENT
Start: 2018-09-11 | End: 2018-09-11 | Stop reason: SURG

## 2018-09-11 RX ORDER — ONDANSETRON 2 MG/ML
4 INJECTION INTRAMUSCULAR; INTRAVENOUS ONCE AS NEEDED
Status: DISCONTINUED | OUTPATIENT
Start: 2018-09-11 | End: 2018-09-11 | Stop reason: HOSPADM

## 2018-09-11 RX ORDER — ONDANSETRON 2 MG/ML
4 INJECTION INTRAMUSCULAR; INTRAVENOUS AS NEEDED
Status: COMPLETED | OUTPATIENT
Start: 2018-09-11 | End: 2018-09-11

## 2018-09-11 RX ORDER — ONDANSETRON 4 MG/1
4 TABLET, FILM COATED ORAL ONCE AS NEEDED
Status: DISCONTINUED | OUTPATIENT
Start: 2018-09-11 | End: 2018-09-11 | Stop reason: HOSPADM

## 2018-09-11 RX ORDER — HYDROCODONE BITARTRATE AND ACETAMINOPHEN 7.5; 325 MG/1; MG/1
1 TABLET ORAL ONCE AS NEEDED
Status: DISCONTINUED | OUTPATIENT
Start: 2018-09-11 | End: 2018-09-11 | Stop reason: HOSPADM

## 2018-09-11 RX ORDER — NEOSTIGMINE METHYLSULFATE 5 MG/5 ML
SYRINGE (ML) INTRAVENOUS AS NEEDED
Status: DISCONTINUED | OUTPATIENT
Start: 2018-09-11 | End: 2018-09-11 | Stop reason: SURG

## 2018-09-11 RX ORDER — ROCURONIUM BROMIDE 10 MG/ML
INJECTION, SOLUTION INTRAVENOUS AS NEEDED
Status: DISCONTINUED | OUTPATIENT
Start: 2018-09-11 | End: 2018-09-11 | Stop reason: SURG

## 2018-09-11 RX ORDER — FENTANYL CITRATE 50 UG/ML
INJECTION, SOLUTION INTRAMUSCULAR; INTRAVENOUS AS NEEDED
Status: DISCONTINUED | OUTPATIENT
Start: 2018-09-11 | End: 2018-09-11 | Stop reason: SURG

## 2018-09-11 RX ORDER — BUPIVACAINE HCL/0.9 % NACL/PF 0.125 %
PLASTIC BAG, INJECTION (ML) EPIDURAL AS NEEDED
Status: DISCONTINUED | OUTPATIENT
Start: 2018-09-11 | End: 2018-09-11 | Stop reason: SURG

## 2018-09-11 RX ORDER — ONDANSETRON 2 MG/ML
INJECTION INTRAMUSCULAR; INTRAVENOUS AS NEEDED
Status: DISCONTINUED | OUTPATIENT
Start: 2018-09-11 | End: 2018-09-11 | Stop reason: SURG

## 2018-09-11 RX ORDER — BUPIVACAINE HYDROCHLORIDE 5 MG/ML
INJECTION, SOLUTION EPIDURAL; INTRACAUDAL AS NEEDED
Status: DISCONTINUED | OUTPATIENT
Start: 2018-09-11 | End: 2018-09-11 | Stop reason: HOSPADM

## 2018-09-11 RX ORDER — KETOROLAC TROMETHAMINE 30 MG/ML
INJECTION, SOLUTION INTRAMUSCULAR; INTRAVENOUS AS NEEDED
Status: DISCONTINUED | OUTPATIENT
Start: 2018-09-11 | End: 2018-09-11 | Stop reason: SURG

## 2018-09-11 RX ORDER — PROMETHAZINE HYDROCHLORIDE 25 MG/ML
6.25 INJECTION, SOLUTION INTRAMUSCULAR; INTRAVENOUS AS NEEDED
Status: COMPLETED | OUTPATIENT
Start: 2018-09-11 | End: 2018-09-11

## 2018-09-11 RX ORDER — HYDROCODONE BITARTRATE AND ACETAMINOPHEN 7.5; 325 MG/1; MG/1
1-2 TABLET ORAL EVERY 4 HOURS PRN
Qty: 20 TABLET | Refills: 0 | OUTPATIENT
Start: 2018-09-11 | End: 2020-12-18

## 2018-09-11 RX ORDER — MIDAZOLAM HYDROCHLORIDE 1 MG/ML
INJECTION INTRAMUSCULAR; INTRAVENOUS AS NEEDED
Status: DISCONTINUED | OUTPATIENT
Start: 2018-09-11 | End: 2018-09-11 | Stop reason: SURG

## 2018-09-11 RX ORDER — GLYCOPYRROLATE 0.2 MG/ML
INJECTION INTRAMUSCULAR; INTRAVENOUS AS NEEDED
Status: DISCONTINUED | OUTPATIENT
Start: 2018-09-11 | End: 2018-09-11 | Stop reason: SURG

## 2018-09-11 RX ORDER — IBUPROFEN 600 MG/1
600 TABLET ORAL EVERY 6 HOURS PRN
Status: DISCONTINUED | OUTPATIENT
Start: 2018-09-11 | End: 2018-09-11 | Stop reason: HOSPADM

## 2018-09-11 RX ORDER — ACETAMINOPHEN 500 MG
1000 TABLET ORAL ONCE
Status: COMPLETED | OUTPATIENT
Start: 2018-09-11 | End: 2018-09-11

## 2018-09-11 RX ORDER — MEPERIDINE HYDROCHLORIDE 50 MG/ML
25 INJECTION INTRAMUSCULAR; INTRAVENOUS; SUBCUTANEOUS
Status: DISCONTINUED | OUTPATIENT
Start: 2018-09-11 | End: 2018-09-11 | Stop reason: HOSPADM

## 2018-09-11 RX ORDER — CLINDAMYCIN PHOSPHATE 900 MG/50ML
900 INJECTION, SOLUTION INTRAVENOUS ONCE
Status: COMPLETED | OUTPATIENT
Start: 2018-09-11 | End: 2018-09-11

## 2018-09-11 RX ORDER — SODIUM CHLORIDE, SODIUM LACTATE, POTASSIUM CHLORIDE, CALCIUM CHLORIDE 600; 310; 30; 20 MG/100ML; MG/100ML; MG/100ML; MG/100ML
1000 INJECTION, SOLUTION INTRAVENOUS CONTINUOUS
Status: DISCONTINUED | OUTPATIENT
Start: 2018-09-11 | End: 2018-09-11 | Stop reason: HOSPADM

## 2018-09-11 RX ORDER — MAGNESIUM HYDROXIDE 1200 MG/15ML
LIQUID ORAL AS NEEDED
Status: DISCONTINUED | OUTPATIENT
Start: 2018-09-11 | End: 2018-09-11 | Stop reason: HOSPADM

## 2018-09-11 RX ORDER — ONDANSETRON 2 MG/ML
INJECTION INTRAMUSCULAR; INTRAVENOUS
Status: COMPLETED
Start: 2018-09-11 | End: 2018-09-11

## 2018-09-11 RX ADMIN — ROCURONIUM BROMIDE 10 MG: 10 INJECTION INTRAVENOUS at 11:31

## 2018-09-11 RX ADMIN — EPHEDRINE SULFATE 15 MG: 50 INJECTION INTRAMUSCULAR; INTRAVENOUS; SUBCUTANEOUS at 11:36

## 2018-09-11 RX ADMIN — LIDOCAINE HYDROCHLORIDE 75 MG: 20 INJECTION, SOLUTION INTRAVENOUS at 11:06

## 2018-09-11 RX ADMIN — ONDANSETRON 4 MG: 2 INJECTION INTRAMUSCULAR; INTRAVENOUS at 13:03

## 2018-09-11 RX ADMIN — ROCURONIUM BROMIDE 10 MG: 10 INJECTION INTRAVENOUS at 11:45

## 2018-09-11 RX ADMIN — FENTANYL CITRATE 50 MCG: 50 INJECTION, SOLUTION INTRAMUSCULAR; INTRAVENOUS at 11:48

## 2018-09-11 RX ADMIN — EPHEDRINE SULFATE 10 MG: 50 INJECTION INTRAMUSCULAR; INTRAVENOUS; SUBCUTANEOUS at 11:45

## 2018-09-11 RX ADMIN — SODIUM CHLORIDE, POTASSIUM CHLORIDE, SODIUM LACTATE AND CALCIUM CHLORIDE 1000 ML: 600; 310; 30; 20 INJECTION, SOLUTION INTRAVENOUS at 09:12

## 2018-09-11 RX ADMIN — PROMETHAZINE HYDROCHLORIDE 6.25 MG: 25 INJECTION INTRAMUSCULAR; INTRAVENOUS at 14:09

## 2018-09-11 RX ADMIN — FENTANYL CITRATE 50 MCG: 50 INJECTION, SOLUTION INTRAMUSCULAR; INTRAVENOUS at 11:09

## 2018-09-11 RX ADMIN — DEXAMETHASONE SODIUM PHOSPHATE 4 MG: 4 INJECTION, SOLUTION INTRAMUSCULAR; INTRAVENOUS at 11:06

## 2018-09-11 RX ADMIN — SODIUM CHLORIDE, POTASSIUM CHLORIDE, SODIUM LACTATE AND CALCIUM CHLORIDE: 600; 310; 30; 20 INJECTION, SOLUTION INTRAVENOUS at 11:45

## 2018-09-11 RX ADMIN — IBUPROFEN 600 MG: 600 TABLET ORAL at 14:09

## 2018-09-11 RX ADMIN — GLYCOPYRROLATE 0.6 MG: 0.2 INJECTION, SOLUTION INTRAMUSCULAR; INTRAVENOUS at 11:53

## 2018-09-11 RX ADMIN — Medication 200 MCG: at 11:10

## 2018-09-11 RX ADMIN — CLINDAMYCIN PHOSPHATE 900 MG: 900 INJECTION, SOLUTION INTRAVENOUS at 10:56

## 2018-09-11 RX ADMIN — FENTANYL CITRATE 100 MCG: 50 INJECTION, SOLUTION INTRAMUSCULAR; INTRAVENOUS at 10:59

## 2018-09-11 RX ADMIN — Medication 4 MG: at 11:53

## 2018-09-11 RX ADMIN — ROCURONIUM BROMIDE 40 MG: 10 INJECTION INTRAVENOUS at 11:06

## 2018-09-11 RX ADMIN — ONDANSETRON 4 MG: 2 INJECTION INTRAMUSCULAR; INTRAVENOUS at 11:06

## 2018-09-11 RX ADMIN — EPHEDRINE SULFATE 10 MG: 50 INJECTION INTRAMUSCULAR; INTRAVENOUS; SUBCUTANEOUS at 11:20

## 2018-09-11 RX ADMIN — PROPOFOL 200 MG: 10 INJECTION, EMULSION INTRAVENOUS at 11:06

## 2018-09-11 RX ADMIN — ONDANSETRON 4 MG: 2 INJECTION, SOLUTION INTRAMUSCULAR; INTRAVENOUS at 13:03

## 2018-09-11 RX ADMIN — Medication 200 MCG: at 11:15

## 2018-09-11 RX ADMIN — Medication 200 MCG: at 11:25

## 2018-09-11 RX ADMIN — MIDAZOLAM HYDROCHLORIDE 2 MG: 1 INJECTION, SOLUTION INTRAMUSCULAR; INTRAVENOUS at 10:56

## 2018-09-11 RX ADMIN — ACETAMINOPHEN 1000 MG: 500 TABLET, FILM COATED ORAL at 10:46

## 2018-09-11 RX ADMIN — KETOROLAC TROMETHAMINE 60 MG: 30 INJECTION, SOLUTION INTRAMUSCULAR at 11:31

## 2018-09-11 RX ADMIN — EPHEDRINE SULFATE 5 MG: 50 INJECTION INTRAMUSCULAR; INTRAVENOUS; SUBCUTANEOUS at 12:02

## 2018-09-11 NOTE — DISCHARGE INSTRUCTIONS
No pushing, pulling, tugging,  heavy lifting, or strenuous activity.  No major decision making, driving, or drinking alcoholic beverages for 24 hours. ( due to the medications you have  received)  Always use good hand hygiene/washing techniques.  NO driving while taking pain medications.    To assist you in voiding:  Drink plenty of fluids  Listen to running water while attempting to void.    If you are unable to urinate and you have an uncomfortable urge to void or it has been   6 hours since you were discharged, return to the Emergency Room

## 2018-09-11 NOTE — H&P (VIEW-ONLY)
Patient: Reji Corbin    YOB: 1955    Date: 08/15/2018    Primary Care Provider: Magy Wheatley MD    Reason for Consultation: Hernia    Chief Complaint   Patient presents with   • Follow-up       Subjective .     History of present illness:  I saw the patient in the office today as a consultation for a follow up colonoscopy, pathology showed hyperplastic changes and mucosal prolapse related changes.  Patient also has a right inguinal hernia noted on ultrasound, patient will be scheduled accordingly. He complains of right inguinal tenderness.     The patient does have right inguinal discomfort, he states that he does have a mass located in the right scrotal region, this is been present for several months, this is increase is size with heavy lifting, relieved by lying down, nonradiating in nature, not associated with any other symptomatology.    The following portions of the patient's history were reviewed and updated as appropriate: allergies, current medications, past family history, past medical history, past social history, past surgical history and problem list.    Review of Systems   Constitutional: Negative for chills, fever and unexpected weight change.   HENT: Negative for trouble swallowing and voice change.    Eyes: Negative for visual disturbance.   Respiratory: Negative for apnea, cough, chest tightness, shortness of breath and wheezing.    Cardiovascular: Negative for chest pain, palpitations and leg swelling.   Gastrointestinal: Negative for abdominal distention, abdominal pain (right inguinal pain), anal bleeding, blood in stool, constipation, diarrhea, nausea, rectal pain and vomiting.   Endocrine: Negative for cold intolerance and heat intolerance.   Genitourinary: Negative for difficulty urinating, dysuria, flank pain, scrotal swelling and testicular pain.   Musculoskeletal: Negative for back pain, gait problem and joint swelling.   Skin: Negative for color change,  "rash and wound.   Neurological: Negative for dizziness, syncope, speech difficulty, weakness, numbness and headaches.   Hematological: Negative for adenopathy. Does not bruise/bleed easily.   Psychiatric/Behavioral: Negative for confusion. The patient is not nervous/anxious.        History:  Past Medical History:   Diagnosis Date   • Arthritis    • Back pain     herniated disks   • Hard of hearing    • History of stress test 2000    normal   • Hypertension        Past Surgical History:   Procedure Laterality Date   • COLONOSCOPY      2013   • COLONOSCOPY N/A 8/9/2018    Procedure: COLONOSCOPY WITH HOT FORCEP POLYPECTOMY;  Surgeon: Leonard Jenkins MD;  Location: [facility] ENDOSCOPY;  Service: Gastroenterology   • KNEE SURGERY         Family History   Problem Relation Age of Onset   • No Known Problems Mother    • Stroke Father        Social History   Substance Use Topics   • Smoking status: Never Smoker   • Smokeless tobacco: Never Used   • Alcohol use No       Allergies:  Allergies   Allergen Reactions   • Codeine Sulfate Irritability       Medications:    Current Outpatient Prescriptions:   •  aspirin 81 MG EC tablet, Take 81 mg by mouth Daily., Disp: , Rfl:   •  lisinopril (PRINIVIL,ZESTRIL) 10 MG tablet, Take 20 mg by mouth 2 (Two) Times a Day. Takes 20mg of the morning and 20mg at night, Disp: , Rfl:   •  Multiple Vitamins-Minerals (MULTIVITAMIN WITH MINERALS) tablet tablet, Take 1 tablet by mouth Daily., Disp: , Rfl:     Objective     Vital Signs:   Vitals:    08/15/18 1444   BP: 130/88   Pulse: 62   Temp: 97.8 °F (36.6 °C)   TempSrc: Temporal Artery    SpO2: 99%   Weight: 83.9 kg (185 lb)   Height: 182.9 cm (72\")       Physical Exam:   General Appearance:    Alert, cooperative, in no acute distress   Head:    Normocephalic, without obvious abnormality, atraumatic   Eyes:            Lids and lashes normal, conjunctivae and sclerae normal, no   icterus, no pallor, corneas clear, PERRLA   Ears:    Ears appear " intact with no abnormalities noted   Throat:   No oral lesions, no thrush, oral mucosa moist   Neck:   No adenopathy, supple, trachea midline, no thyromegaly, no   carotid bruit, no JVD   Lungs:     Clear to auscultation,respirations regular, even and                  unlabored    Heart:    Regular rhythm and normal rate, normal S1 and S2, no            murmur   Abdomen:     no masses, no organomegaly, soft non-tender, non-distended, no guarding, there is evidence of a large incarcerated right inguinal hernia that does extend into the scrotum    Extremities:   Moves all extremities well, no edema, no cyanosis, no             redness   Pulses:   Pulses palpable and equal bilaterally   Skin:   No bleeding, bruising or rash   Lymph nodes:   No palpable adenopathy   Neurologic:   Cranial nerves 2 - 12 grossly intact, sensation intact        Results Review:   I reviewed the patient's new clinical results.  I reviewed the patient's new imaging results and agree with the interpretation.  I reviewed the patient's other test results and agree with the interpretation    Review of Systems was reviewed and confirmed as accurate today.    Assessment/Plan :    1. Hyperplastic polyp of sigmoid colon    2. Right lower quadrant abdominal pain    3. Right inguinal hernia        I had a detailed and extensive discussion with the patient in the office and they understand that they need to undergo hernia repair with mesh.  Full risks and benefits of operative versus nonoperative intervention were discussed with the patient and these included things such as nonresolution of symptoms and possible worsening of symptoms without surgical intervention versus infection, bleeding, possible recurrent hernia, possible postoperative neuralgia from nerve damage or involvement with scar tissue, etc.  The patient understands, agrees, and had no questions for me at the end of the office visit.     I discussed the patients findings and my  recommendations with patient.    Electronically signed by Leonard Jenkins MD  08/15/18      Portions of this note have been scribed for Leonard Jenkins MD by Katy Iirzarry. 8/15/2018  3:13 PM

## 2018-09-11 NOTE — ANESTHESIA PROCEDURE NOTES
Airway  Urgency: elective    Airway not difficult    General Information and Staff    Patient location during procedure: OR  CRNA: MARBELLA WALKER    Indications and Patient Condition  Indications for airway management: airway protection    Preoxygenated: yes  Mask difficulty assessment: 1 - vent by mask    Final Airway Details  Final airway type: endotracheal airway      Successful airway: ETT  Cuffed: yes   Successful intubation technique: direct laryngoscopy  Facilitating devices/methods: intubating stylet  Endotracheal tube insertion site: oral  Blade: Bardales  Blade size: 2  ETT size: 7.5 mm  Cormack-Lehane Classification: grade IIb - view of arytenoids or posterior of glottis only  Placement verified by: chest auscultation and capnometry   Measured from: lips  ETT to lips (cm): 22  Number of attempts at approach: 1    Additional Comments  Airway placed without problems. Dentition and Lips as pre-induction. ETT cuff inflated to minimal occlusive pressure.

## 2018-09-11 NOTE — OP NOTE
PATIENT:    Reji Corbin    DATE OF SURGERY:   9/11/2018    PHYSICIAN:    Leonard Jenkins MD    REFERRING PHYSICIAN:  Magy Wheatley MD    YOB: 1955    PREOPERATIVE DIAGNOSIS:  Right inguinal hernia    POSTOPERATIVE DIAGNOSIS: Right inguinal hernia    PROCEDURE: Right inguinal herniorraphy via Shanique repair    HISTORY:  The patient presents to me for evaluation and treatment of a history significant for a right inguinal hernia.    ANESTHESIA:  General endotracheal.    OPERATIVE PROCEDURE:  The patient was taken to the operating room, placed in the supine position, and given general endotracheal anesthesia per the Anesthesia service.  The patient was prepped and draped in the normal sterile fashion and the patient was given preoperative IV antibiotics.  An appropriate timeout per the nursing staff was performed prior to the incision.  I did meet with the patient preoperatively and marked them accordingly.    A transverse incision was made over the right inguinal canal.  This was sharply dissected down through the subcutaneous fat to the external oblique fibers, which were then divided in their direction.  Blunt dissection was used to dissect the surrounding cord structures from the pubic tubercle.  There was evidence of an indirect sac.  Large lipoma of the cord was excised and tied, sac was opened and suture ligated.    A 3 x 6 piece of Marlex mesh was then fashioned and placed in the floor of the inguinal canal.  It was sutured in place medially with 0-Prolene suture to the pubic tubercle.  This was continued inferiorly and laterally to the shelving edge of the inguinal ligament and superiorly and laterally to the transversalis fascia.  The ends of the Marlex mesh were divided in order to create a new internal inguinal ring.  This was snugly reapproximated with an 0-Prolene suture.    I felt that I had adequate repair at this point in time.  Marcaine was then injected in the  fascia for postoperative anesthetic.      0-Vicryl suture was used in a running fashion to close the external oblique fibers.  3-0 Vicryl suture was used in a running fashion to close the wound.  A 4-0 Vicryl subcuticular stitch and Steri-Strips were used for skin reapproximation.      The patient was stable at this point in time and subsequently transferred back to the recovery room in stable condition.    Leonard Jenkins MD

## 2018-09-11 NOTE — ANESTHESIA PREPROCEDURE EVALUATION
Anesthesia Evaluation     Patient summary reviewed and Nursing notes reviewed   no history of anesthetic complications:  NPO Solid Status: > 8 hours  NPO Liquid Status: > 8 hours           Airway   Mallampati: I  TM distance: >3 FB  Neck ROM: full  no difficulty expected  Dental - normal exam     Pulmonary - negative pulmonary ROS and normal exam   (-) not a smoker  Cardiovascular - normal exam    ECG reviewed  PT is on anticoagulation therapy  Rhythm: regular  Rate: normal    (+) hypertension less than 2 medications,     ROS comment: Sinus bradycardia otherwise normal    Neuro/Psych  (+) headaches,     GI/Hepatic/Renal/Endo    (+)  GERD,      Musculoskeletal     (+) back pain (DDD),   Abdominal  - normal exam    Abdomen: soft.  Bowel sounds: normal.   Substance History - negative use     OB/GYN negative ob/gyn ROS         Other   (+) arthritis                     Anesthesia Plan    ASA 3     general   (Risks and benefits of general anesthesia discussed including risk of aspiration, recall, dental damage, cardiac or respiratory compromise, or death. All patient questions answered.  Patient told a breathing tube will be used to manage the airway.    Will continue with plan of care.)  intravenous induction   Anesthetic plan, all risks, benefits, and alternatives have been provided, discussed and informed consent has been obtained with: patient.

## 2018-09-11 NOTE — ANESTHESIA POSTPROCEDURE EVALUATION
Patient: Reji Corbin    Procedure Summary     Date:  09/11/18 Room / Location:  [facility] OR  /  [facility]    Anesthesia Start:  1056 Anesthesia Stop:  1214    Procedure:  INGUINAL HERNIA  REPAIR WITH MESH (Right Abdomen) Diagnosis:       Right inguinal hernia      (Right inguinal hernia [K40.90])    Surgeon:  Leonard Jenkins MD Provider:  Italo Wen CRNA    Anesthesia Type:  general ASA Status:  3          Anesthesia Type: general  Last vitals  BP   169/80   Temp   97.9   Pulse 71   Resp   18   SpO2   100%     Post Anesthesia Care and Evaluation    Patient location during evaluation: PACU  Patient participation: complete - patient participated  Level of consciousness: awake and awake and alert  Pain score: 0  Pain management: adequate  Airway patency: patent  Anesthetic complications: No anesthetic complications  PONV Status: none  Cardiovascular status: acceptable  Respiratory status: acceptable, face mask, spontaneous ventilation and nonlabored ventilation  Hydration status: acceptable

## 2018-09-16 LAB
LAB AP CASE REPORT: NORMAL
PATH REPORT.FINAL DX SPEC: NORMAL

## 2018-09-26 ENCOUNTER — OFFICE VISIT (OUTPATIENT)
Dept: SURGERY | Facility: CLINIC | Age: 63
End: 2018-09-26

## 2018-09-26 VITALS
BODY MASS INDEX: 25.32 KG/M2 | HEIGHT: 72 IN | HEART RATE: 65 BPM | TEMPERATURE: 98 F | WEIGHT: 186.95 LBS | DIASTOLIC BLOOD PRESSURE: 84 MMHG | SYSTOLIC BLOOD PRESSURE: 132 MMHG | OXYGEN SATURATION: 99 %

## 2018-09-26 DIAGNOSIS — Z48.89 POSTOPERATIVE VISIT: Primary | ICD-10-CM

## 2018-09-26 PROCEDURE — 99024 POSTOP FOLLOW-UP VISIT: CPT | Performed by: SURGERY

## 2018-09-26 NOTE — PROGRESS NOTES
"Patient: Reji Corbin    YOB: 1955    Date: 09/26/2018    Primary Care Provider: Magy Wheatley MD    Reason for Consultation: Follow-up hernia    Chief Complaint   Patient presents with   • Post-op Hernia       History of present illness:  I saw the patient in the office today as a followup from their recent right inguinal hernia repair.  The pathology showed benign hernia sac with reactive changes.  They state that they have done well and are having no complaints.    The following portions of the patient's history were reviewed and updated as appropriate: allergies, current medications, past family history, past medical history, past social history, past surgical history and problem list.    Vital Signs:   Vitals:    09/26/18 1243   BP: 132/84   Pulse: 65   Temp: 98 °F (36.7 °C)   TempSrc: Temporal Artery    SpO2: 99%   Weight: 84.8 kg (186 lb 15.2 oz)   Height: 182.9 cm (72.01\")       Physical Exam:   General Appearance:    Alert, cooperative, in no acute distress   Abdomen:     no masses, no organomegaly, soft non-tender, non-distended, no guarding, wounds are well healed, no evidence of recurrent hernia         Assessment / Plan:    1. Postoperative visit        I did discuss the situation with the patient today in the office and they have done well from their recent herniorraphy.  I have released the patient back to normal activity, they understand that they need to be careful about heavy lifting.  I need to see the patient back in the office only if they are having further problems, they know to call me if they are.    Electronically signed by Leonard Jenkins MD  09/26/18    Portions of this note have been scribed for Leonard Jenkins MD by Michelle Milligan. 9/26/2018  1:04 PM                "

## 2020-07-06 ENCOUNTER — OFFICE VISIT (OUTPATIENT)
Dept: SURGERY | Facility: CLINIC | Age: 65
End: 2020-07-06

## 2020-07-06 VITALS
BODY MASS INDEX: 25.6 KG/M2 | HEART RATE: 98 BPM | HEIGHT: 70 IN | SYSTOLIC BLOOD PRESSURE: 140 MMHG | OXYGEN SATURATION: 98 % | DIASTOLIC BLOOD PRESSURE: 90 MMHG | WEIGHT: 178.8 LBS | TEMPERATURE: 98 F

## 2020-07-06 DIAGNOSIS — L03.90 CELLULITIS, UNSPECIFIED CELLULITIS SITE: Primary | ICD-10-CM

## 2020-07-06 PROCEDURE — 10061 I&D ABSCESS COMP/MULTIPLE: CPT | Performed by: SURGERY

## 2020-07-06 PROCEDURE — 99243 OFF/OP CNSLTJ NEW/EST LOW 30: CPT | Performed by: SURGERY

## 2020-07-06 RX ORDER — AMOXICILLIN AND CLAVULANATE POTASSIUM 875; 125 MG/1; MG/1
1 TABLET, FILM COATED ORAL 2 TIMES DAILY
Qty: 14 TABLET | Refills: 0 | OUTPATIENT
Start: 2020-07-06 | End: 2020-12-18

## 2020-07-06 RX ORDER — CEPHALEXIN 500 MG/1
CAPSULE ORAL
COMMUNITY
Start: 2020-07-02 | End: 2020-07-06

## 2020-07-06 NOTE — PROGRESS NOTES
Patient: Reji Corbin    YOB: 1955    Date: 07/06/2020    Primary Care Provider: Jessica Mays APRN    Chief Complaint   Patient presents with   • Cellulitis       SUBJECTIVE:    History of present illness:  I saw the patient in the office today for an evaluation and consultation cellulitis @ left index finger. Patient complains of pain, redness and swelling @ left index finger after he punctured his finger while working on equipment.  He is currently on Keflex.     Apparently this does cause him sharp pain, located in the left index finger, present over the past several days, associated with swelling and erythema, has been associated with previous drainage.  Nonradiating in nature, pressure tends to make it worse.  This was done while working on a hydraulic line on his back although and this was a type of injection injury.    The following portions of the patient's history were reviewed and updated as appropriate: allergies, current medications, past family history, past medical history, past social history, past surgical history and problem list.      Review of Systems   Constitutional: Negative for chills, fever and unexpected weight change.   HENT: Negative for trouble swallowing and voice change.    Eyes: Negative for visual disturbance.   Respiratory: Negative for apnea, cough, chest tightness, shortness of breath and wheezing.    Cardiovascular: Negative for chest pain, palpitations and leg swelling.   Gastrointestinal: Negative for abdominal distention, abdominal pain, anal bleeding, blood in stool, constipation, diarrhea, nausea, rectal pain and vomiting.   Endocrine: Negative for cold intolerance and heat intolerance.   Genitourinary: Negative for difficulty urinating, dysuria, flank pain, scrotal swelling and testicular pain.   Musculoskeletal: Negative for back pain, gait problem and joint swelling.   Skin: Negative for color change, rash and wound.   Neurological: Negative for  dizziness, syncope, speech difficulty, weakness, numbness and headaches.   Hematological: Negative for adenopathy. Does not bruise/bleed easily.   Psychiatric/Behavioral: Negative for confusion. The patient is not nervous/anxious.        Allergies:  Allergies   Allergen Reactions   • Codeine Sulfate Irritability       Medications:    Current Outpatient Medications:   •  amoxicillin-clavulanate (Augmentin) 875-125 MG per tablet, Take 1 tablet by mouth 2 (Two) Times a Day., Disp: 14 tablet, Rfl: 0  •  aspirin 81 MG EC tablet, Take 81 mg by mouth Daily., Disp: , Rfl:   •  HYDROcodone-acetaminophen (NORCO) 7.5-325 MG per tablet, Take 1-2 tablets by mouth Every 4 (Four) Hours As Needed for Moderate Pain, Disp: 20 tablet, Rfl: 0  •  lisinopril (PRINIVIL,ZESTRIL) 10 MG tablet, Take 20 mg by mouth 2 (Two) Times a Day. Takes 20mg of the morning and 20mg at night, Disp: , Rfl:   •  Multiple Vitamins-Minerals (MULTIVITAMIN WITH MINERALS) tablet tablet, Take 1 tablet by mouth Daily., Disp: , Rfl:     History:  Past Medical History:   Diagnosis Date   • Arthritis    • Back pain     herniated disks   • DDD (degenerative disc disease), lumbosacral    • GERD (gastroesophageal reflux disease)    • Hard of hearing    • History of stress test 2000    normal   • [place] (hard of hearing)     NO AIDS WORN   • Hypertension    • Lower back pain    • Migraines     CAUSED BY INCREASED BP   • Right knee injury     20-25 YEARS AGO       Past Surgical History:   Procedure Laterality Date   • COLONOSCOPY      2013   • COLONOSCOPY N/A 8/9/2018    Procedure: COLONOSCOPY WITH HOT FORCEP POLYPECTOMY;  Surgeon: Leonard Jenkins MD;  Location: [facility] ENDOSCOPY;  Service: Gastroenterology   • INGUINAL HERNIA REPAIR Right 9/11/2018    Procedure: INGUINAL HERNIA  REPAIR WITH MESH;  Surgeon: Leonard Jenkins MD;  Location: [facility] OR;  Service: General   • KNEE SURGERY Right     MENISCUS TEAR REPAIRED       Family History   Problem Relation Age of Onset   •  "No Known Problems Mother    • Stroke Father        Social History     Tobacco Use   • Smoking status: Never Smoker   • Smokeless tobacco: Never Used   Substance Use Topics   • Alcohol use: No   • Drug use: No        OBJECTIVE:    Vital Signs:   Vitals:    07/06/20 0938   BP: 140/90   Pulse: 98   Temp: 98 °F (36.7 °C)   SpO2: 98%   Weight: 81.1 kg (178 lb 12.8 oz)   Height: 177.8 cm (70\")       Physical Exam:   General Appearance:    Alert, cooperative, in no acute distress   Head:    Normocephalic, without obvious abnormality, atraumatic   Eyes:            Lids and lashes normal, conjunctivae and sclerae normal, no   icterus, no pallor, corneas clear, PERRLA   Ears:    Ears appear intact with no abnormalities noted   Throat:   No oral lesions, no thrush, oral mucosa moist   Neck:   No adenopathy, supple, trachea midline, no thyromegaly, no   carotid bruit, no JVD   Lungs:     Clear to auscultation,respirations regular, even and                  unlabored    Heart:    Regular rhythm and normal rate, normal S1 and S2, no            murmur, no gallop, no rub, no click   Chest Wall:    No abnormalities observed   Abdomen:     Normal bowel sounds, no masses, no organomegaly, soft        non-tender, non-distended, no guarding, no rebound                tenderness   Extremities:   Moves all extremities well, no edema, no cyanosis, no             redness   Pulses:   Pulses palpable and equal bilaterally   Skin:   No bleeding, bruising or rash left second finger from the distal interphalangeal joint distally is swollen and slightly erythematous, there is an opening on the plantar aspect with slight drainage   Lymph nodes:   No palpable adenopathy   Neurologic:   Cranial nerves 2 - 12 grossly intact, sensation intact, DTR       present and equal bilaterally     Results Review:   I reviewed the patient's new clinical results.  I reviewed the patient's new imaging results and agree with the interpretation.  I reviewed the " patient's other test results and agree with the interpretation    Review of Systems was reviewed and confirmed as accurate as documented by the MA.    ASSESSMENT/PLAN:    1. Cellulitis, unspecified cellulitis site        I had a detailed and extensive discussion with the patient in the office today and I have asked him about the possibility of referral to hand surgery.  He has refused this, I have told him this probably needs to undergo incision and drainage and he wants me to pursue this in the office.  Risk and benefits were discussed with him.    Local lidocaine was used for anesthesia, an incision was made after the area was prepped and draped in the normal sterile fashion and this was done with 11 blade knife.  There was purulent material that was returned and cultures were sent.  It was copiously irrigated with saline and peroxide and then opened with Metzenbaum scissors.  Dressings were applied along with Polysporin and wound care instructions were given.    I discussed the patients findings and my recommendations with patient, I did have a detailed discussion with him regarding wound care and have also changed him from oral Keflex to oral Augmentin and then I want to see him back in the office in 1 week.  If he does not improve later this week he knows to call me.        Electronically signed by Leonard Jenkins MD  07/06/20

## 2020-07-07 ENCOUNTER — RESULTS ENCOUNTER (OUTPATIENT)
Dept: SURGERY | Facility: CLINIC | Age: 65
End: 2020-07-07

## 2020-07-07 DIAGNOSIS — L03.90 CELLULITIS, UNSPECIFIED CELLULITIS SITE: ICD-10-CM

## 2020-07-08 ENCOUNTER — APPOINTMENT (OUTPATIENT)
Dept: PREADMISSION TESTING | Facility: HOSPITAL | Age: 65
End: 2020-07-08

## 2020-07-08 LAB — SPECIMEN STATUS: NORMAL

## 2020-07-08 PROCEDURE — U0004 COV-19 TEST NON-CDC HGH THRU: HCPCS

## 2020-07-08 PROCEDURE — C9803 HOPD COVID-19 SPEC COLLECT: HCPCS

## 2020-07-08 PROCEDURE — U0002 COVID-19 LAB TEST NON-CDC: HCPCS

## 2020-07-09 LAB
REF LAB TEST METHOD: NORMAL
SARS-COV-2 RNA RESP QL NAA+PROBE: NOT DETECTED

## 2020-07-10 ENCOUNTER — HOSPITAL ENCOUNTER (OUTPATIENT)
Dept: CARDIOLOGY | Facility: HOSPITAL | Age: 65
Discharge: HOME | End: 2020-07-10
Admitting: PLASTIC SURGERY

## 2020-07-10 ENCOUNTER — TRANSCRIBE ORDERS (OUTPATIENT)
Dept: CARDIOLOGY | Facility: HOSPITAL | Age: 65
End: 2020-07-10

## 2020-07-10 DIAGNOSIS — I49.9 CARDIAC ARRHYTHMIA, UNSPECIFIED CARDIAC ARRHYTHMIA TYPE: Primary | ICD-10-CM

## 2020-07-10 DIAGNOSIS — I49.9 CARDIAC ARRHYTHMIA, UNSPECIFIED CARDIAC ARRHYTHMIA TYPE: ICD-10-CM

## 2020-07-10 LAB
BACTERIA SPEC AEROBE CULT: ABNORMAL
BACTERIA SPEC ANAEROBE CULT: ABNORMAL
BACTERIA SPEC CULT: ABNORMAL
OTHER ANTIBIOTIC SUSC ISLT: ABNORMAL

## 2020-07-10 PROCEDURE — 93005 ELECTROCARDIOGRAM TRACING: CPT | Performed by: PLASTIC SURGERY

## 2020-07-10 PROCEDURE — 93010 ELECTROCARDIOGRAM REPORT: CPT | Performed by: INTERNAL MEDICINE

## 2020-12-18 ENCOUNTER — APPOINTMENT (OUTPATIENT)
Dept: CT IMAGING | Facility: HOSPITAL | Age: 65
End: 2020-12-18

## 2020-12-18 ENCOUNTER — HOSPITAL ENCOUNTER (EMERGENCY)
Facility: HOSPITAL | Age: 65
Discharge: HOME | End: 2020-12-18
Attending: EMERGENCY MEDICINE | Admitting: EMERGENCY MEDICINE

## 2020-12-18 ENCOUNTER — APPOINTMENT (OUTPATIENT)
Dept: GENERAL RADIOLOGY | Facility: HOSPITAL | Age: 65
End: 2020-12-18

## 2020-12-18 VITALS
RESPIRATION RATE: 16 BRPM | HEART RATE: 68 BPM | TEMPERATURE: 98.7 F | SYSTOLIC BLOOD PRESSURE: 121 MMHG | HEIGHT: 72 IN | BODY MASS INDEX: 27.09 KG/M2 | OXYGEN SATURATION: 94 % | WEIGHT: 200 LBS | DIASTOLIC BLOOD PRESSURE: 78 MMHG

## 2020-12-18 DIAGNOSIS — J98.8 RESPIRATORY TRACT INFECTION DUE TO COVID-19 VIRUS: Primary | ICD-10-CM

## 2020-12-18 DIAGNOSIS — U07.1 RESPIRATORY TRACT INFECTION DUE TO COVID-19 VIRUS: Primary | ICD-10-CM

## 2020-12-18 LAB
ALBUMIN SERPL-MCNC: 4.1 G/DL (ref 3.5–5.2)
ALBUMIN/GLOB SERPL: 1.3 G/DL
ALP SERPL-CCNC: 81 U/L (ref 39–117)
ALT SERPL W P-5'-P-CCNC: 19 U/L (ref 1–41)
ANION GAP SERPL CALCULATED.3IONS-SCNC: 12 MMOL/L (ref 5–15)
AST SERPL-CCNC: 41 U/L (ref 1–40)
BASOPHILS # BLD AUTO: 0.01 10*3/MM3 (ref 0–0.2)
BASOPHILS NFR BLD AUTO: 0.2 % (ref 0–1.5)
BILIRUB SERPL-MCNC: 1 MG/DL (ref 0–1.2)
BUN SERPL-MCNC: 21 MG/DL (ref 8–23)
BUN/CREAT SERPL: 22.8 (ref 7–25)
CALCIUM SPEC-SCNC: 9 MG/DL (ref 8.6–10.5)
CHLORIDE SERPL-SCNC: 102 MMOL/L (ref 98–107)
CO2 SERPL-SCNC: 26 MMOL/L (ref 22–29)
CREAT SERPL-MCNC: 0.92 MG/DL (ref 0.76–1.27)
DEPRECATED RDW RBC AUTO: 39.6 FL (ref 37–54)
EOSINOPHIL # BLD AUTO: 0.01 10*3/MM3 (ref 0–0.4)
EOSINOPHIL NFR BLD AUTO: 0.2 % (ref 0.3–6.2)
ERYTHROCYTE [DISTWIDTH] IN BLOOD BY AUTOMATED COUNT: 12.1 % (ref 12.3–15.4)
GFR SERPL CREATININE-BSD FRML MDRD: 83 ML/MIN/1.73
GLOBULIN UR ELPH-MCNC: 3.1 GM/DL
GLUCOSE SERPL-MCNC: 87 MG/DL (ref 65–99)
HCT VFR BLD AUTO: 49.5 % (ref 37.5–51)
HGB BLD-MCNC: 16.5 G/DL (ref 13–17.7)
HOLD SPECIMEN: NORMAL
HOLD SPECIMEN: NORMAL
IMM GRANULOCYTES # BLD AUTO: 0.04 10*3/MM3 (ref 0–0.05)
IMM GRANULOCYTES NFR BLD AUTO: 0.6 % (ref 0–0.5)
LYMPHOCYTES # BLD AUTO: 0.66 10*3/MM3 (ref 0.7–3.1)
LYMPHOCYTES NFR BLD AUTO: 10 % (ref 19.6–45.3)
MCH RBC QN AUTO: 29.5 PG (ref 26.6–33)
MCHC RBC AUTO-ENTMCNC: 33.3 G/DL (ref 31.5–35.7)
MCV RBC AUTO: 88.6 FL (ref 79–97)
MONOCYTES # BLD AUTO: 0.78 10*3/MM3 (ref 0.1–0.9)
MONOCYTES NFR BLD AUTO: 11.8 % (ref 5–12)
NEUTROPHILS NFR BLD AUTO: 5.12 10*3/MM3 (ref 1.7–7)
NEUTROPHILS NFR BLD AUTO: 77.2 % (ref 42.7–76)
NRBC BLD AUTO-RTO: 0 /100 WBC (ref 0–0.2)
NT-PROBNP SERPL-MCNC: 17 PG/ML (ref 0–900)
PLATELET # BLD AUTO: 158 10*3/MM3 (ref 140–450)
PMV BLD AUTO: 11.1 FL (ref 6–12)
POTASSIUM SERPL-SCNC: 4.2 MMOL/L (ref 3.5–5.2)
PROT SERPL-MCNC: 7.2 G/DL (ref 6–8.5)
QT INTERVAL: 378 MS
QTC INTERVAL: 419 MS
RBC # BLD AUTO: 5.59 10*6/MM3 (ref 4.14–5.8)
SODIUM SERPL-SCNC: 140 MMOL/L (ref 136–145)
TROPONIN T SERPL-MCNC: <0.01 NG/ML (ref 0–0.03)
WBC # BLD AUTO: 6.62 10*3/MM3 (ref 3.4–10.8)
WHOLE BLOOD HOLD SPECIMEN: NORMAL
WHOLE BLOOD HOLD SPECIMEN: NORMAL

## 2020-12-18 PROCEDURE — 83880 ASSAY OF NATRIURETIC PEPTIDE: CPT | Performed by: EMERGENCY MEDICINE

## 2020-12-18 PROCEDURE — 80053 COMPREHEN METABOLIC PANEL: CPT | Performed by: EMERGENCY MEDICINE

## 2020-12-18 PROCEDURE — 71275 CT ANGIOGRAPHY CHEST: CPT

## 2020-12-18 PROCEDURE — 85025 COMPLETE CBC W/AUTO DIFF WBC: CPT | Performed by: EMERGENCY MEDICINE

## 2020-12-18 PROCEDURE — 99283 EMERGENCY DEPT VISIT LOW MDM: CPT

## 2020-12-18 PROCEDURE — 93005 ELECTROCARDIOGRAM TRACING: CPT | Performed by: EMERGENCY MEDICINE

## 2020-12-18 PROCEDURE — 25010000002 METHYLPREDNISOLONE PER 125 MG: Performed by: EMERGENCY MEDICINE

## 2020-12-18 PROCEDURE — 96375 TX/PRO/DX INJ NEW DRUG ADDON: CPT

## 2020-12-18 PROCEDURE — 25010000002 MORPHINE PER 10 MG: Performed by: EMERGENCY MEDICINE

## 2020-12-18 PROCEDURE — 84484 ASSAY OF TROPONIN QUANT: CPT | Performed by: EMERGENCY MEDICINE

## 2020-12-18 PROCEDURE — 25010000002 ONDANSETRON PER 1 MG: Performed by: EMERGENCY MEDICINE

## 2020-12-18 PROCEDURE — 71045 X-RAY EXAM CHEST 1 VIEW: CPT

## 2020-12-18 PROCEDURE — 0 IOPAMIDOL PER 1 ML: Performed by: EMERGENCY MEDICINE

## 2020-12-18 PROCEDURE — 96374 THER/PROPH/DIAG INJ IV PUSH: CPT

## 2020-12-18 RX ORDER — METHYLPREDNISOLONE SODIUM SUCCINATE 125 MG/2ML
125 INJECTION, POWDER, LYOPHILIZED, FOR SOLUTION INTRAMUSCULAR; INTRAVENOUS ONCE
Status: COMPLETED | OUTPATIENT
Start: 2020-12-18 | End: 2020-12-18

## 2020-12-18 RX ORDER — ONDANSETRON 2 MG/ML
4 INJECTION INTRAMUSCULAR; INTRAVENOUS ONCE
Status: COMPLETED | OUTPATIENT
Start: 2020-12-18 | End: 2020-12-18

## 2020-12-18 RX ORDER — SODIUM CHLORIDE 0.9 % (FLUSH) 0.9 %
10 SYRINGE (ML) INJECTION AS NEEDED
Status: DISCONTINUED | OUTPATIENT
Start: 2020-12-18 | End: 2020-12-18 | Stop reason: HOSPADM

## 2020-12-18 RX ORDER — AZITHROMYCIN 250 MG/1
TABLET, FILM COATED ORAL
Qty: 6 TABLET | Refills: 0 | Status: SHIPPED | OUTPATIENT
Start: 2020-12-18 | End: 2022-07-07

## 2020-12-18 RX ORDER — PREDNISONE 20 MG/1
20 TABLET ORAL DAILY
Qty: 5 TABLET | Refills: 0 | Status: SHIPPED | OUTPATIENT
Start: 2020-12-18 | End: 2022-07-07

## 2020-12-18 RX ORDER — MORPHINE SULFATE 4 MG/ML
4 INJECTION, SOLUTION INTRAMUSCULAR; INTRAVENOUS ONCE
Status: COMPLETED | OUTPATIENT
Start: 2020-12-18 | End: 2020-12-18

## 2020-12-18 RX ORDER — AZITHROMYCIN 250 MG/1
250 TABLET, FILM COATED ORAL DAILY
COMMUNITY
End: 2020-12-18

## 2020-12-18 RX ORDER — METHYLPREDNISOLONE 4 MG/1
4 TABLET ORAL DAILY
COMMUNITY
End: 2022-07-07

## 2020-12-18 RX ADMIN — METHYLPREDNISOLONE SODIUM SUCCINATE 125 MG: 125 INJECTION, POWDER, FOR SOLUTION INTRAMUSCULAR; INTRAVENOUS at 13:44

## 2020-12-18 RX ADMIN — ONDANSETRON 4 MG: 2 INJECTION INTRAMUSCULAR; INTRAVENOUS at 15:44

## 2020-12-18 RX ADMIN — SODIUM CHLORIDE, POTASSIUM CHLORIDE, SODIUM LACTATE AND CALCIUM CHLORIDE 500 ML: 600; 310; 30; 20 INJECTION, SOLUTION INTRAVENOUS at 13:44

## 2020-12-18 RX ADMIN — IOPAMIDOL 75 ML: 755 INJECTION, SOLUTION INTRAVENOUS at 14:44

## 2020-12-18 RX ADMIN — MORPHINE SULFATE 4 MG: 4 INJECTION, SOLUTION INTRAMUSCULAR; INTRAVENOUS at 13:48

## 2020-12-19 NOTE — ED PROVIDER NOTES
Subjective   The patient presents to the emergency department with Covid related symptoms.  The patient has had symptoms for about 10 days.  He reports cough, congestion, and shortness of breath.  Symptoms have worsened over the last couple of days.  Patient tested positive for Covid 10 days ago but states he has had worsening of symptoms over about the last 3 days.  The patient has been monitoring his oxygen saturation at home and he is maintaining 92% or higher.  He does have some chest tightness as well.  He reports some low-grade fever.  No vomiting or diarrhea.      History provided by:  Patient      Review of Systems   Constitutional: Positive for fever.   Respiratory: Positive for cough, chest tightness and shortness of breath.    Gastrointestinal: Negative.    Skin: Negative.    Neurological: Negative.    Psychiatric/Behavioral: Negative.    All other systems reviewed and are negative.      Past Medical History:   Diagnosis Date   • Arthritis    • Back pain     herniated disks   • DDD (degenerative disc disease), lumbosacral    • GERD (gastroesophageal reflux disease)    • Hard of hearing    • History of stress test 2000    normal   • [place] (hard of hearing)     NO AIDS WORN   • Hypertension    • Lower back pain    • Migraines     CAUSED BY INCREASED BP   • Right knee injury     20-25 YEARS AGO       Allergies   Allergen Reactions   • Codeine Sulfate Irritability       Past Surgical History:   Procedure Laterality Date   • COLONOSCOPY      2013   • COLONOSCOPY N/A 8/9/2018    Procedure: COLONOSCOPY WITH HOT FORCEP POLYPECTOMY;  Surgeon: Leonard Jenkins MD;  Location: [facility] ENDOSCOPY;  Service: Gastroenterology   • INGUINAL HERNIA REPAIR Right 9/11/2018    Procedure: INGUINAL HERNIA  REPAIR WITH MESH;  Surgeon: Leonard Jenkins MD;  Location: [facility] OR;  Service: General   • KNEE SURGERY Right     MENISCUS TEAR REPAIRED       Family History   Problem Relation Age of Onset   • No Known Problems Mother    •  Stroke Father        Social History     Socioeconomic History   • Marital status:      Spouse name: Not on file   • Number of children: Not on file   • Years of education: Not on file   • Highest education level: Not on file   Tobacco Use   • Smoking status: Never Smoker   • Smokeless tobacco: Never Used   Substance and Sexual Activity   • Alcohol use: No   • Drug use: No   • Sexual activity: Defer           Objective   Physical Exam  Vitals signs and nursing note reviewed.   Constitutional:       General: He is not in acute distress.     Appearance: He is well-developed and normal weight. He is not toxic-appearing.   HENT:      Head: Normocephalic.   Cardiovascular:      Rate and Rhythm: Normal rate.      Pulses: Normal pulses.   Pulmonary:      Effort: Pulmonary effort is normal.      Breath sounds: Normal breath sounds.   Abdominal:      General: Bowel sounds are normal.      Palpations: Abdomen is soft.      Tenderness: There is no abdominal tenderness. There is no guarding.   Musculoskeletal: Normal range of motion.   Skin:     General: Skin is warm and dry.      Capillary Refill: Capillary refill takes less than 2 seconds.   Neurological:      General: No focal deficit present.      Mental Status: He is alert and oriented to person, place, and time.   Psychiatric:         Mood and Affect: Mood normal.         Behavior: Behavior normal.         Procedures           ED Course  ED Course as of Dec 19 0638   Fri Dec 18, 2020   1406 proBNP: 17.0 [RS]   1414 Troponin T: <0.010 [RS]   1424 Personally reviewed the 1 view of the chest that demonstrates some patchy infiltrate bilaterally.  Findings consistent with known diagnosis of COVID-19.  See report for details.   XR Chest 1 View [RS]   1508 Patient is resting comfortably.  His oxygen saturation is maintained in the mid 90s on room air throughout.  Vital signs are otherwise unremarkable.  I talked with him about the findings of the CT scan and labs.  No  evidence of clot or lobar pneumonia.  Findings consistent with Covid.  I talked with the patient about the differential and disposition plans.  At this point, he is agreeable with discharge home and will continue to monitor his oxygen saturations.  I did advise the potential for worsening with need for return if he has any concerns or if his oxygen consistently drops below 92.  He voiced understanding and agreed. I had a discussion with the patient/family regarding diagnosis, diagnostic results, treatment plan, and medications.  The patient/family indicated understanding of these instructions.  I spent adequate time at the bedside proceeding discharge necessary to personally discuss the aftercare instructions, giving patient education, providing explanations of the results of our evaluations/findings, and my decision making to assure that the patient/family understand the plan of care.  Time was allotted to answer questions at that time and throughout the ED course.  Emphasis was placed on timely follow-up after discharge.  I also discussed the potential for the development of an acute emergent condition requiring further evaluation, admission, or even surgical intervention. I discussed that we found nothing during the visit today indicating the need for further workup, admission, or the presence of an unstable medical condition.  I encouraged the patient to return to the emergency department immediately for ANY concerns, worsening, new complaints, or if symptoms persist and unable to seek follow-up in a timely fashion.  The patient/family expressed understanding and agreement with this plan.     [RS]      ED Course User Index  [RS] Danny Moreira MD                                           MDM  Number of Diagnoses or Management Options  Respiratory tract infection due to COVID-19 virus:   Diagnosis management comments: Recent Results (from the past 24 hour(s))  -Comprehensive Metabolic Panel  Collection  Time: 12/18/20  1:11 PM  Specimen: Blood       Result                      Value             Ref Range           Glucose                     87                65 - 99 mg/dL       BUN                         21                8 - 23 mg/dL        Creatinine                  0.92              0.76 - 1.27 *       Sodium                      140               136 - 145 mm*       Potassium                   4.2               3.5 - 5.2 mm*       Chloride                    102               98 - 107 mmo*       CO2                         26.0              22.0 - 29.0 *       Calcium                     9.0               8.6 - 10.5 m*       Total Protein               7.2               6.0 - 8.5 g/*       Albumin                     4.10              3.50 - 5.20 *       ALT (SGPT)                  19                1 - 41 U/L          AST (SGOT)                  41 (H)            1 - 40 U/L          Alkaline Phosphatase        81                39 - 117 U/L        Total Bilirubin             1.0               0.0 - 1.2 mg*       eGFR Non  Amer       83                >60 mL/min/1*       Globulin                    3.1               gm/dL               A/G Ratio                   1.3               g/dL                BUN/Creatinine Ratio        22.8              7.0 - 25.0          Anion Gap                   12.0              5.0 - 15.0 m*  -BNP  Collection Time: 12/18/20  1:11 PM  Specimen: Blood       Result                      Value             Ref Range           proBNP                      17.0              0.0 - 900.0 *  -Troponin  Collection Time: 12/18/20  1:11 PM  Specimen: Blood       Result                      Value             Ref Range           Troponin T                  <0.010            0.000 - 0.03*  -Light Blue Top  Collection Time: 12/18/20  1:11 PM       Result                      Value             Ref Range           Extra Tube                                                    hold for  add-on  -Green Top (Gel)  Collection Time: 12/18/20  1:11 PM       Result                      Value             Ref Range           Extra Tube                                                    Hold for add-ons.  -Lavender Top  Collection Time: 12/18/20  1:11 PM       Result                      Value             Ref Range           Extra Tube                                                    hold for add-on  -Gold Top - SST  Collection Time: 12/18/20  1:11 PM       Result                      Value             Ref Range           Extra Tube                                                    Hold for add-ons.  -CBC Auto Differential  Collection Time: 12/18/20  1:11 PM  Specimen: Blood       Result                      Value             Ref Range           WBC                         6.62              3.40 - 10.80*       RBC                         5.59              4.14 - 5.80 *       Hemoglobin                  16.5              13.0 - 17.7 *       Hematocrit                  49.5              37.5 - 51.0 %       MCV                         88.6              79.0 - 97.0 *       MCH                         29.5              26.6 - 33.0 *       MCHC                        33.3              31.5 - 35.7 *       RDW                         12.1 (L)          12.3 - 15.4 %       RDW-SD                      39.6              37.0 - 54.0 *       MPV                         11.1              6.0 - 12.0 fL       Platelets                   158               140 - 450 10*       Neutrophil %                77.2 (H)          42.7 - 76.0 %       Lymphocyte %                10.0 (L)          19.6 - 45.3 %       Monocyte %                  11.8              5.0 - 12.0 %        Eosinophil %                0.2 (L)           0.3 - 6.2 %         Basophil %                  0.2               0.0 - 1.5 %         Immature Grans %            0.6 (H)           0.0 - 0.5 %         Neutrophils, Absolute       5.12              1.70 - 7.00  *       Lymphocytes, Absolute       0.66 (L)          0.70 - 3.10 *       Monocytes, Absolute         0.78              0.10 - 0.90 *       Eosinophils, Absolute       0.01              0.00 - 0.40 *       Basophils, Absolute         0.01              0.00 - 0.20 *       Immature Grans, Absolu*     0.04              0.00 - 0.05 *       nRBC                        0.0               0.0 - 0.2 /1*  -ECG 12 Lead  Collection Time: 12/18/20  1:12 PM       Result                      Value             Ref Range           QT Interval                 378               ms                  QTC Interval                419               ms             Note: In addition to lab results from this visit, the labs listed above may include labs taken at another facility or during a different encounter within the last 24 hours. Please correlate lab times with ED admission and discharge times for further clarification of the services performed during this visit.    CT Angiogram Chest   Final Result    No acute pulmonary embolus. Peripheral areas of groundglass    opacity, fairly typical for Covid 19 related pneumonia.              This report was finalized on 12/18/2020 2:50 PM by Zafar Mahoney.          XR Chest 1 View   Final Result    Patchy ill-defined areas of airspace disease are present,    somewhat most notable in the right midlung field and right suprahilar    region.          This report was finalized on 12/18/2020 2:12 PM by Zafar Mahoney.          --------------------------------------------               12/18/20 12/18/20                      1255            1547         --------------------------------------------   BP:          143/76          121/78         BP Location:    Left arm                        Patient Position:    Sitting          Sitting        Pulse:         72              68           Resp:          18              16           Temp:   98.6 °F (37 °C) 98.7 °F  "(37.1 °C)   TempSrc:    Infrared          Oral          SpO2:         94%              94%          Weight: 90.7 kg (200 lb)                    Height:  182.9 cm (72\")                    --------------------------------------------  Medications  methylPREDNISolone sodium succinate (SOLU-Medrol) injection 125 mg (125 mg Intravenous Given 12/18/20 1344)  lactated ringers bolus 500 mL (0 mL Intravenous Stopped 12/18/20 1414)  Morphine sulfate (PF) injection 4 mg (4 mg Intravenous Given 12/18/20 1348)  iopamidol (ISOVUE-370) 76 % injection 100 mL (75 mL Intravenous Given 12/18/20 1444)  ondansetron (ZOFRAN) injection 4 mg (4 mg Intravenous Given 12/18/20 1544)  ECG/EMG Results (last 24 hours)     Procedure Component Value Units Date/Time    ECG 12 Lead ([id number]) Collected: 12/18/20 1312     Updated: 12/18/20 1400     QT Interval 378 ms      QTC Interval 419 ms     Narrative:      Test Reason : SOA Protocol  Blood Pressure : **/** mmHG  Vent. Rate : 074 BPM     Atrial Rate : 074 BPM     P-R Int : 166 ms          QRS Dur : 076 ms      QT Int : 378 ms       P-R-T Axes : 066 -16 009 degrees     QTc Int : 419 ms    Normal sinus rhythm  Minimal voltage criteria for LVH, may be normal variant  Borderline ECG  When compared with ECG of 10-JUL-2020 14:15,  Sinus rhythm has replaced Atrial fibrillation  Nonspecific T wave abnormality no longer evident in Lateral leads  Confirmed by STACY ROWE MD ([id number]) on 12/18/2020 2:00:24 PM    Referred By:  EDMD           Confirmed By:STACY ROWE MD      ECG 12 Lead   Final Result    Test Reason : SOA Protocol    Blood Pressure : **/** mmHG    Vent. Rate : 074 BPM     Atrial Rate : 074 BPM       P-R Int : 166 ms          QRS Dur : 076 ms        QT Int : 378 ms       P-R-T Axes : 066 -16 009 degrees       QTc Int : 419 ms        Normal sinus rhythm    Minimal voltage criteria for LVH, may be normal variant    Borderline ECG    When compared with ECG of 10-JUL-2020 " 14:15,    Sinus rhythm has replaced Atrial fibrillation    Nonspecific T wave abnormality no longer evident in Lateral leads    Confirmed by DANNY MOREIRA MD ([id number]) on 12/18/2020 2:00:24 PM        Referred By:  EDMD           Confirmed By:DANNY MOREIRA MD            Amount and/or Complexity of Data Reviewed  Clinical lab tests: reviewed  Tests in the radiology section of CPT®: reviewed  Independent visualization of images, tracings, or specimens: yes        Final diagnoses:   Respiratory tract infection due to COVID-19 virus            Danny Moreira MD  12/19/20 0644

## 2022-06-13 NOTE — TELEPHONE ENCOUNTER
Caller: Reji Corbin    Relationship: Self    Best call back number: [phone]    Requested Prescriptions:   Requested Prescriptions     Pending Prescriptions Disp Refills   • lisinopril (PRINIVIL,ZESTRIL) 10 MG tablet       Sig: Take 2 tablets by mouth 2 (Two) Times a Day. Takes 20mg of the morning and 20mg at night        Pharmacy where request should be sent: [facility]Y #[id number] - [place], [place] - [address] [phone] [facility] [phone]      Additional details provided by patient: THE PATIENT STATES THAT HE JUST GOT THE MEDICATION FILLED AND HAS ENOUGH MEDICATION TO LAST THIS MONTH THE PATIENT WOULD LIKE TO START GETTING THE MEDICATION AS 90 DAY SUPPLY HE WOULD LIKE A NEW PRESCRIPTION SENT TO [facility] FOR 90 DAY SUPPLY     Does the patient have less than a 3 day supply:  [] Yes  [x] No    Magdalene Shrestha Rep   06/13/22 11:45 EDT

## 2022-06-14 RX ORDER — LISINOPRIL 20 MG/1
20 TABLET ORAL 2 TIMES DAILY
Qty: 180 TABLET | Refills: 3 | Status: SHIPPED | OUTPATIENT
Start: 2022-06-14 | End: 2022-07-07 | Stop reason: SDUPTHER

## 2022-07-07 ENCOUNTER — OFFICE VISIT (OUTPATIENT)
Dept: INTERNAL MEDICINE | Facility: CLINIC | Age: 67
End: 2022-07-07

## 2022-07-07 VITALS
DIASTOLIC BLOOD PRESSURE: 70 MMHG | WEIGHT: 200 LBS | SYSTOLIC BLOOD PRESSURE: 120 MMHG | OXYGEN SATURATION: 97 % | TEMPERATURE: 98 F | HEART RATE: 61 BPM | HEIGHT: 72 IN | BODY MASS INDEX: 27.09 KG/M2

## 2022-07-07 DIAGNOSIS — H93.90 EAR LESION: ICD-10-CM

## 2022-07-07 DIAGNOSIS — K64.8 OTHER HEMORRHOIDS: Primary | ICD-10-CM

## 2022-07-07 DIAGNOSIS — E78.2 MIXED HYPERLIPIDEMIA: ICD-10-CM

## 2022-07-07 DIAGNOSIS — N52.01 ERECTILE DYSFUNCTION DUE TO ARTERIAL INSUFFICIENCY: ICD-10-CM

## 2022-07-07 DIAGNOSIS — I10 HTN (HYPERTENSION), BENIGN: ICD-10-CM

## 2022-07-07 PROCEDURE — 1170F FXNL STATUS ASSESSED: CPT | Performed by: INTERNAL MEDICINE

## 2022-07-07 PROCEDURE — 93000 ELECTROCARDIOGRAM COMPLETE: CPT | Performed by: INTERNAL MEDICINE

## 2022-07-07 PROCEDURE — 1159F MED LIST DOCD IN RCRD: CPT | Performed by: INTERNAL MEDICINE

## 2022-07-07 PROCEDURE — G0402 INITIAL PREVENTIVE EXAM: HCPCS | Performed by: INTERNAL MEDICINE

## 2022-07-07 RX ORDER — LISINOPRIL 20 MG/1
20 TABLET ORAL DAILY
Qty: 90 TABLET | Refills: 3 | Status: SHIPPED | OUTPATIENT
Start: 2022-07-07

## 2022-07-07 RX ORDER — TADALAFIL 20 MG/1
20 TABLET ORAL DAILY PRN
Qty: 20 TABLET | Refills: 5 | Status: SHIPPED | OUTPATIENT
Start: 2022-07-07

## 2022-07-07 NOTE — PROGRESS NOTES
The ABCs of the Annual Wellness Visit  Welcome to Medicare Visit    Chief Complaint   Patient presents with   • Medicare Wellness-Initial Visit     Fasting today, bleeding hemorrhoids - requesting referral, requesting heart check up (light headed at times, soa and chest pain on exertion)    • Suspicious Skin Lesion     On right ear      Subjective {   History of Present Illness:  Reji Corbin is a 67 y.o. male who presents for a  Welcome to Medicare Visit.    The following portions of the patient's history were reviewed and   updated as appropriate: allergies, current medications, past family history, past medical history, past social history, past surgical history and problem list.     Compared to one year ago, the patient feels his physical   health is the same.    Compared to one year ago, the patient feels his mental   health is the same.    Recent Hospitalizations:  He was not admitted to the hospital during the last year.       Current Medical Providers:  Patient Care Team:  Johny Mallory MD as PCP - General (Internal Medicine)  Leonard Jenkins MD as Consulting Physician (General Surgery)    Outpatient Medications Prior to Visit   Medication Sig Dispense Refill   • lisinopril (PRINIVIL,ZESTRIL) 20 MG tablet Take 1 tablet by mouth 2 (Two) Times a Day. Takes 20mg of the morning and 20mg at night 180 tablet 3   • Multiple Vitamins-Minerals (MULTIVITAMIN WITH MINERALS) tablet tablet Take 1 tablet by mouth Daily.     • aspirin 81 MG EC tablet Take 81 mg by mouth Daily.     • azithromycin (Zithromax Z-Stephan) 250 MG tablet Take 2 tablets the first day, then 1 tablet daily for 4 days. 6 tablet 0   • HYDROcod Polst-CPM Polst ER (Tussionex Pennkinetic ER) 10-8 MG/5ML ER suspension Take 5 mL by mouth Every 12 (Twelve) Hours As Needed for Cough. 50 mL 0   • ipratropium-albuterol (COMBIVENT RESPIMAT)  MCG/ACT inhaler Inhale 1 puff 4 (Four) Times a Day As Needed for Wheezing.     • methylPREDNISolone (MEDROL) 4  MG tablet Take 4 mg by mouth Daily.     • predniSONE (DELTASONE) 20 MG tablet Take 1 tablet by mouth Daily. 5 tablet 0     No facility-administered medications prior to visit.       No opioid medication identified on active medication list. I have reviewed chart for other potential  high risk medication/s and harmful drug interactions in the elderly.          Aspirin is not on active medication list.  Aspirin use is not indicated based on review of current medical condition/s. Risk of harm outweighs potential benefits.  .    Patient Active Problem List   Diagnosis   • HLD (hyperlipidemia)   • BP (high blood pressure)   • Erectile dysfunction   • Screen for colon cancer   • Right inguinal hernia     Advance Care Planning  Advance Directive is not on file.  ACP discussion was held with the patient during this visit. Patient does not have an advance directive, information provided.    Review of Systems   Constitutional: Negative for activity change, appetite change, fatigue and fever.   HENT: Negative for congestion, ear discharge, ear pain and trouble swallowing.    Eyes: Negative for photophobia and visual disturbance.   Respiratory: Negative for cough and shortness of breath.    Cardiovascular: Negative for chest pain and palpitations.   Gastrointestinal: Negative for abdominal distention, constipation, diarrhea, nausea and vomiting.   Genitourinary: Negative for dysuria, hematuria and urgency.   Musculoskeletal: Positive for arthralgias. Negative for back pain, joint swelling and myalgias.   Skin: Positive for rash. Negative for color change.   Neurological: Negative for dizziness, weakness, light-headedness and confusion.   Hematological: Negative for adenopathy. Does not bruise/bleed easily.   Psychiatric/Behavioral: Negative for agitation and dysphoric mood. The patient is not nervous/anxious.         Objective      Vitals:    07/07/22 0920   BP: 120/70   Pulse: 61   Temp: 98 °F (36.7 °C)   TempSrc: Infrared  "  SpO2: 97%   Weight: 90.7 kg (200 lb)   Height: 182.9 cm (72\")   PainSc: 0-No pain     Estimated body mass index is 27.12 kg/m² as calculated from the following:    Height as of this encounter: 182.9 cm (72\").    Weight as of this encounter: 90.7 kg (200 lb).    BMI is >= 25 and <30. (Overweight) The following options were offered after discussion;: nutrition counseling/recommendations      Does the patient have evidence of cognitive impairment? No    Physical Exam  Constitutional:       General: He is not in acute distress.     Appearance: He is well-developed.   HENT:      Nose: Nose normal.   Eyes:      General: No scleral icterus.     Conjunctiva/sclera: Conjunctivae normal.   Neck:      Thyroid: No thyromegaly.      Trachea: No tracheal deviation.   Cardiovascular:      Rate and Rhythm: Normal rate and regular rhythm.      Heart sounds: No murmur heard.    No friction rub.   Pulmonary:      Effort: No respiratory distress.      Breath sounds: No wheezing or rales.   Abdominal:      General: There is no distension.      Palpations: Abdomen is soft. There is no mass.      Tenderness: There is no abdominal tenderness. There is no guarding.   Genitourinary:         Comments: Pedunculated mass about 1 cm long  Musculoskeletal:         General: Deformity present. Normal range of motion.   Lymphadenopathy:      Cervical: No cervical adenopathy.   Skin:     General: Skin is warm and dry.      Findings: Rash present. No erythema.   Neurological:      Mental Status: He is alert and oriented to person, place, and time.      Cranial Nerves: No cranial nerve deficit.      Coordination: Coordination normal.      Deep Tendon Reflexes: Reflexes are normal and symmetric.   Psychiatric:         Behavior: Behavior normal.         Thought Content: Thought content normal.         Judgment: Judgment normal.                ECG 12 Lead    Date/Time: 7/7/2022 12:47 PM  Performed by: Johny Mallory MD  Authorized by: Johny Mallory, " MD   Comparison: compared with previous ECG from 12/18/2020  Similar to previous ECG  Rhythm: sinus rhythm and sinus bradycardia  Rate: normal  ST Segments: ST segments normal  T Waves: T waves normal  QRS axis: normal  Other findings: non-specific ST-T wave changes    Clinical impression: non-specific ECG               HEALTH RISK ASSESSMENT    Smoking Status:  Social History     Tobacco Use   Smoking Status Never Smoker   Smokeless Tobacco Never Used     Alcohol Consumption:  Social History     Substance and Sexual Activity   Alcohol Use No       Fall Risk Screen:    STEADI Fall Risk Assessment has not been completed.    Depression Screen:   PHQ-2/PHQ-9 Depression Screening 7/7/2022   Little Interest or Pleasure in Doing Things 0-->not at all   Feeling Down, Depressed or Hopeless 0-->not at all   PHQ-9: Brief Depression Severity Measure Score 0       Health Habits and Functional and Cognitive Screening:  No flowsheet data found.    Visual Acuity:    No exam data present    Age-appropriate Screening Schedule:  Refer to the list below for future screening recommendations based on patient's age, sex and/or medical conditions. Orders for these recommended tests are listed in the plan section. The patient has been provided with a written plan.    Health Maintenance   Topic Date Due   • TDAP/TD VACCINES (1 - Tdap) Never done   • ZOSTER VACCINE (1 of 2) Never done   • LIPID PANEL  06/05/2018   • INFLUENZA VACCINE  10/01/2022          Assessment & Plan   CMS Preventative Services Quick Reference  Risk Factors Identified During Encounter  Immunizations Discussed/Encouraged (specific Immunizations; Prevnar 20 (Pneumococcal 20-valent conjugate)  The above risks/problems have been discussed with the patient.  Pertinent information has been shared with the patient in the After Visit Summary.  Follow up plans and orders are seen below in the Assessment/Plan Section.    Diagnoses and all orders for this visit:    1. Other  hemorrhoids (Primary) has tried conservative measures.  Denies constipation.  Referral to surgery    2. Mixed hyperlipidemia continue with the dietary restrictions follow lipid profile    3. HTN (hypertension), benign stable on low-salt diet continue with lisinopril 20 mg daily    4. Erectile dysfunction due to arterial insufficiency Cialis as needed    .pro    Follow Up:   No follow-ups on file.     An After Visit Summary and PPPS were made available to the patient.

## 2022-07-08 DIAGNOSIS — R73.9 HYPERGLYCEMIA: Primary | ICD-10-CM

## 2022-07-08 LAB
ALBUMIN SERPL-MCNC: 5 G/DL (ref 3.5–5.2)
ALBUMIN/GLOB SERPL: 2.1 G/DL
ALP SERPL-CCNC: 54 U/L (ref 39–117)
ALT SERPL-CCNC: 28 U/L (ref 1–41)
AST SERPL-CCNC: 35 U/L (ref 1–40)
BILIRUB SERPL-MCNC: 0.6 MG/DL (ref 0–1.2)
BUN SERPL-MCNC: 10 MG/DL (ref 8–23)
BUN/CREAT SERPL: 11.4 (ref 7–25)
CALCIUM SERPL-MCNC: 9.9 MG/DL (ref 8.6–10.5)
CHLORIDE SERPL-SCNC: 98 MMOL/L (ref 98–107)
CHOLEST SERPL-MCNC: 159 MG/DL (ref 0–200)
CO2 SERPL-SCNC: 28.2 MMOL/L (ref 22–29)
CREAT SERPL-MCNC: 0.88 MG/DL (ref 0.76–1.27)
EGFRCR SERPLBLD CKD-EPI 2021: 95.4 ML/MIN/1.73
GLOBULIN SER CALC-MCNC: 2.4 GM/DL
GLUCOSE SERPL-MCNC: 239 MG/DL (ref 65–99)
HBA1C MFR BLD: NORMAL %
HCV AB S/CO SERPL IA: 0.1 S/CO RATIO (ref 0–0.9)
HDLC SERPL-MCNC: 50 MG/DL (ref 40–60)
LDLC SERPL CALC-MCNC: 82 MG/DL (ref 0–100)
Lab: NORMAL
POTASSIUM SERPL-SCNC: 4.4 MMOL/L (ref 3.5–5.2)
PROT SERPL-MCNC: 7.4 G/DL (ref 6–8.5)
SODIUM SERPL-SCNC: 138 MMOL/L (ref 136–145)
SPECIMEN STATUS: NORMAL
TRIGL SERPL-MCNC: 155 MG/DL (ref 0–150)
VLDLC SERPL CALC-MCNC: 27 MG/DL (ref 5–40)
WRITTEN AUTHORIZATION: NORMAL

## 2022-07-11 DIAGNOSIS — R73.9 HYPERGLYCEMIA: Primary | ICD-10-CM

## 2022-07-11 PROCEDURE — 83036 HEMOGLOBIN GLYCOSYLATED A1C: CPT | Performed by: INTERNAL MEDICINE

## 2022-07-12 ENCOUNTER — CLINICAL SUPPORT (OUTPATIENT)
Dept: INTERNAL MEDICINE | Facility: CLINIC | Age: 67
End: 2022-07-12

## 2022-07-12 LAB
EXPIRATION DATE: NORMAL
HBA1C MFR BLD: 5.1 %
Lab: NORMAL

## 2022-07-26 NOTE — PROGRESS NOTES
Patient: Reji Corbin    YOB: 1955    Date: 07/27/2022    Primary Care Provider: Johny Mallory MD    Chief Complaint   Patient presents with   • Hemorrhoids       SUBJECTIVE:    History of present illness:  I saw the patient in the office today as a consultation for evaluation and treatment of hemorrhoids.  Patient's last colonoscopy was on 08- and pathology showed colonic type mucosa with surface hyperplastic changes and mucosal prolapse related changes.  Patient states he does have some rectal bleeding, mainly when he wipes.     He does complain of symptomatic hemorrhoids that prolapse with defecation.    The following portions of the patient's history were reviewed and updated as appropriate: allergies, current medications, past family history, past medical history, past social history, past surgical history and problem list.    Review of Systems   Constitutional: Negative for chills, fever and unexpected weight change.   HENT: Negative for trouble swallowing and voice change.    Eyes: Negative for visual disturbance.   Respiratory: Negative for apnea, cough, chest tightness, shortness of breath and wheezing.    Cardiovascular: Negative for chest pain, palpitations and leg swelling.   Gastrointestinal: Positive for anal bleeding and rectal pain. Negative for abdominal distention, abdominal pain, blood in stool, constipation, diarrhea, nausea and vomiting.   Endocrine: Negative for cold intolerance and heat intolerance.   Genitourinary: Negative for difficulty urinating, dysuria, flank pain, scrotal swelling and testicular pain.   Musculoskeletal: Negative for back pain, gait problem and joint swelling.   Skin: Negative for color change, rash and wound.   Neurological: Negative for dizziness, syncope, speech difficulty, weakness, numbness and headaches.   Hematological: Negative for adenopathy. Does not bruise/bleed easily.   Psychiatric/Behavioral: Negative for confusion. The patient  is not nervous/anxious.        History:  Past Medical History:   Diagnosis Date   • Arthritis    • Back pain     herniated disks   • DDD (degenerative disc disease), lumbosacral    • GERD (gastroesophageal reflux disease)    • Hard of hearing    • History of stress test 2000    normal   • [place] (hard of hearing)     NO AIDS WORN   • Hypertension    • Lower back pain    • Migraines     CAUSED BY INCREASED BP   • Right knee injury     20-25 YEARS AGO       Past Surgical History:   Procedure Laterality Date   • COLONOSCOPY      2013   • COLONOSCOPY N/A 8/9/2018    Procedure: COLONOSCOPY WITH HOT FORCEP POLYPECTOMY;  Surgeon: Leonard Jenkins MD;  Location: [facility] ENDOSCOPY;  Service: Gastroenterology   • INGUINAL HERNIA REPAIR Right 9/11/2018    Procedure: INGUINAL HERNIA  REPAIR WITH MESH;  Surgeon: Leonard Jenkins MD;  Location: [facility] OR;  Service: General   • KNEE SURGERY Right     MENISCUS TEAR REPAIRED       Family History   Problem Relation Age of Onset   • Hypertension Mother    • Arthritis Mother    • Stroke Father        Social History     Tobacco Use   • Smoking status: Never Smoker   • Smokeless tobacco: Never Used   Vaping Use   • Vaping Use: Never used   Substance Use Topics   • Alcohol use: No   • Drug use: No       Allergies:  Allergies   Allergen Reactions   • Codeine Sulfate Irritability       Medications:    Current Outpatient Medications:   •  lisinopril (PRINIVIL,ZESTRIL) 20 MG tablet, Take 1 tablet by mouth Daily. Takes 20mg of the morning and 20mg at night, Disp: 90 tablet, Rfl: 3  •  Multiple Vitamins-Minerals (MULTIVITAMIN WITH MINERALS) tablet tablet, Take 1 tablet by mouth Daily., Disp: , Rfl:   •  tadalafil (Cialis) 20 MG tablet, Take 1 tablet by mouth Daily As Needed for Erectile Dysfunction., Disp: 20 tablet, Rfl: 5    OBJECTIVE:    Vital Signs:   Vitals:    07/27/22 1337   BP: 142/80   BP Location: Right arm   Patient Position: Sitting   Cuff Size: Adult   Pulse: 68   Temp: 97.8 °F (36.6  "°C)   TempSrc: Temporal   SpO2: 98%   Weight: 89.4 kg (197 lb)   Height: 182.9 cm (72\")       Physical Exam:   General Appearance:    Alert, cooperative, in no acute distress   Head:    Normocephalic, without obvious abnormality, atraumatic   Eyes:            Lids and lashes normal, conjunctivae and sclerae normal, no   icterus, no pallor, corneas clear, PERRL   Ears:    Ears appear intact with no abnormalities noted   Throat:   No oral lesions, no thrush, oral mucosa moist   Neck:   No adenopathy, supple, trachea midline, no thyromegaly,  no JVD   Lungs:     Clear to auscultation,respirations regular, even and                  unlabored    Heart:    Regular rhythm and normal rate, normal S1 and S2, no            murmur   Abdomen:     no masses, no organomegaly, soft non-tender, non-distended, no guarding, there is no evidence of tenderness, no peritoneal signs   Extremities:   Moves all extremities well, no edema, no cyanosis, no             redness   Pulses:   Pulses palpable and equal bilaterally   Skin:   No bleeding, bruising or rash   Lymph nodes:   No palpable adenopathy   Neurologic:   Cranial nerves 2 - 12 grossly intact, sensation intact      Results Review:   I reviewed the patient's new clinical results.  I reviewed the patient's new imaging results and agree with the interpretation.  I reviewed the patient's other test results and agree with the interpretation    Review of Systems was reviewed and confirmed as accurate as documented by the MA.    ASSESSMENT/PLAN:    1. Rectal bleed        I recommend a colonoscopy for further evaluation. The procedure was explained as well as the risks which include but are not limited to bleeding, infection, perforation, abdominal pain etc. The patient understands these risks and the procedure and wishes to proceed.  He very well may require internal hemorrhoid banding at the time of endoscopy.    Electronically signed by Leonard Jenkins MD  07/27/22 17:05 EDT    "

## 2022-07-27 ENCOUNTER — OFFICE VISIT (OUTPATIENT)
Dept: SURGERY | Facility: CLINIC | Age: 67
End: 2022-07-27

## 2022-07-27 VITALS
SYSTOLIC BLOOD PRESSURE: 142 MMHG | OXYGEN SATURATION: 98 % | WEIGHT: 197 LBS | TEMPERATURE: 97.8 F | DIASTOLIC BLOOD PRESSURE: 80 MMHG | HEART RATE: 68 BPM | HEIGHT: 72 IN | BODY MASS INDEX: 26.68 KG/M2

## 2022-07-27 DIAGNOSIS — K62.5 RECTAL BLEED: Primary | ICD-10-CM

## 2022-07-27 PROCEDURE — 99214 OFFICE O/P EST MOD 30 MIN: CPT | Performed by: SURGERY

## 2022-07-27 RX ORDER — BISACODYL 5 MG
TABLET, DELAYED RELEASE (ENTERIC COATED) ORAL
Qty: 4 TABLET | Refills: 0 | Status: SHIPPED | OUTPATIENT
Start: 2022-07-27 | End: 2022-08-22

## 2022-07-27 RX ORDER — POLYETHYLENE GLYCOL 3350 17 G/17G
POWDER, FOR SOLUTION ORAL
Qty: 238 G | Refills: 0 | Status: SHIPPED | OUTPATIENT
Start: 2022-07-27 | End: 2022-08-22

## 2022-08-04 ENCOUNTER — TELEPHONE (OUTPATIENT)
Dept: SURGERY | Facility: CLINIC | Age: 67
End: 2022-08-04

## 2022-08-09 ENCOUNTER — OUTSIDE FACILITY SERVICE (OUTPATIENT)
Dept: SURGERY | Facility: CLINIC | Age: 67
End: 2022-08-09

## 2022-08-09 PROCEDURE — 45384 COLONOSCOPY W/LESION REMOVAL: CPT | Performed by: SURGERY

## 2022-08-09 PROCEDURE — 45385 COLONOSCOPY W/LESION REMOVAL: CPT | Performed by: SURGERY

## 2022-08-09 PROCEDURE — 45380 COLONOSCOPY AND BIOPSY: CPT | Performed by: SURGERY

## 2022-08-22 ENCOUNTER — OFFICE VISIT (OUTPATIENT)
Dept: INTERNAL MEDICINE | Facility: CLINIC | Age: 67
End: 2022-08-22

## 2022-08-22 VITALS
HEART RATE: 73 BPM | SYSTOLIC BLOOD PRESSURE: 120 MMHG | OXYGEN SATURATION: 97 % | TEMPERATURE: 97.8 F | WEIGHT: 197 LBS | BODY MASS INDEX: 26.68 KG/M2 | DIASTOLIC BLOOD PRESSURE: 82 MMHG | HEIGHT: 72 IN

## 2022-08-22 DIAGNOSIS — S90.812A ABRASION, LEFT FOOT, INITIAL ENCOUNTER: ICD-10-CM

## 2022-08-22 DIAGNOSIS — T14.8XXA PUNCTURE WOUND: Primary | ICD-10-CM

## 2022-08-22 DIAGNOSIS — Z23 NEED FOR TD VACCINE: ICD-10-CM

## 2022-08-22 PROCEDURE — 90714 TD VACC NO PRESV 7 YRS+ IM: CPT | Performed by: INTERNAL MEDICINE

## 2022-08-22 PROCEDURE — 99213 OFFICE O/P EST LOW 20 MIN: CPT | Performed by: INTERNAL MEDICINE

## 2022-08-22 PROCEDURE — 90471 IMMUNIZATION ADMIN: CPT | Performed by: INTERNAL MEDICINE

## 2022-08-22 RX ORDER — CIPROFLOXACIN 500 MG/1
500 TABLET, FILM COATED ORAL 2 TIMES DAILY
Qty: 20 TABLET | Refills: 0 | Status: SHIPPED | OUTPATIENT
Start: 2022-08-22

## 2022-08-22 NOTE — PROGRESS NOTES
"Chief Complaint   Patient presents with   • Foot Pain     (L)- Pt stepped on nail approximately 3 weeks ago.       Subjective   Reji Corbin is a 67 y.o. male.     Pt here today after he stepped on a nail several weeks ago.   He has a lot of pain and tenderness.   No fevers or chills, no drainage.   Has been warm and slightly red.   Cannot remember last Td.   Stepped on nail thru his tennis shoe.          The following portions of the patient's history were reviewed and updated as appropriate: allergies, current medications, past family history, past medical history, past social history, past surgical history and problem list.    Review of Systems   Constitutional: Negative for chills and fever.   Skin: Positive for wound.       Objective   /82 (BP Location: Left arm, Patient Position: Lying, Cuff Size: Adult)   Pulse 73   Temp 97.8 °F (36.6 °C) (Temporal)   Ht 182.9 cm (72\")   Wt 89.4 kg (197 lb)   SpO2 97%   BMI 26.72 kg/m²   Body mass index is 26.72 kg/m².  Physical Exam  Vitals and nursing note reviewed.   Constitutional:       General: He is not in acute distress.     Appearance: Normal appearance. He is not ill-appearing.      Comments: Kind and pleasant man in no distress today   HENT:      Right Ear: External ear normal.      Left Ear: External ear normal.   Eyes:      General:         Right eye: No discharge.         Left eye: No discharge.      Extraocular Movements: Extraocular movements intact.   Pulmonary:      Effort: Pulmonary effort is normal. No respiratory distress.   Skin:     Comments: Left foot plantar surface inferior to fifth toe is a puncture wound approximately 3 mm in size with surrounding white halo approximately 1 cm in size, there is tenderness to palpation, minimal increased warmth, no significant erythema or edema, no discharge or drainage noted, no foreign body   Neurological:      General: No focal deficit present.      Mental Status: He is alert and oriented to " person, place, and time.   Psychiatric:         Mood and Affect: Mood normal.         Behavior: Behavior normal.         Thought Content: Thought content normal.         Judgment: Judgment normal.         Assessment & Plan   Reji Corbin is here today and the following problems have been addressed:      Diagnoses and all orders for this visit:    1. Puncture wound (Primary)  -     Td Vaccine Greater Than or Equal To 6yo Preservative Free IM    2. Need for Td vaccine  -     Td Vaccine Greater Than or Equal To 6yo Preservative Free IM    3. Abrasion, left foot, initial encounter   -     Td Vaccine Greater Than or Equal To 6yo Preservative Free IM    Other orders  -     ciprofloxacin (Cipro) 500 MG tablet; Take 1 tablet by mouth 2 (Two) Times a Day.  Dispense: 20 tablet; Refill: 0    Patient provided with Td vaccine today  Puncture wound is worsening in regard to pain daily since this occurred 3 weeks ago  Encourage patient to soak foot in Epson salt and warm water once or twice daily  Provided Cipro 500 mg to take twice daily for 10 days, increased risk for Pseudomonas infection due to puncture wound through tennis shoe    Return to clinic if symptoms worsen or persist    Please note that portions of this note were completed with a voice recognition program.  Efforts were made to edit dictation, but occasionally words are mistranscribed.

## 2022-08-23 ENCOUNTER — OFFICE VISIT (OUTPATIENT)
Dept: SURGERY | Facility: CLINIC | Age: 67
End: 2022-08-23

## 2022-08-23 VITALS
OXYGEN SATURATION: 98 % | TEMPERATURE: 97.8 F | SYSTOLIC BLOOD PRESSURE: 118 MMHG | BODY MASS INDEX: 26.68 KG/M2 | WEIGHT: 197 LBS | RESPIRATION RATE: 18 BRPM | DIASTOLIC BLOOD PRESSURE: 80 MMHG | HEART RATE: 74 BPM | HEIGHT: 72 IN

## 2022-08-23 DIAGNOSIS — D12.6 ADENOMATOUS POLYP OF COLON, UNSPECIFIED PART OF COLON: Primary | ICD-10-CM

## 2022-08-23 PROCEDURE — 99213 OFFICE O/P EST LOW 20 MIN: CPT | Performed by: SURGERY

## 2022-08-23 NOTE — PROGRESS NOTES
Patient: Reji Corbin    YOB: 1955    Date: 08/23/2022    Primary Care Provider: Johny Mallory MD    Reason for Consultation: Follow-up colonoscopy    Chief Complaint   Patient presents with   • Follow-up       History of present illness:  I saw the patient in the office today as a followup from their recent colonoscopy with colon and rectal polypectomy, the pathology report did show sessile serrated adenoma, tubular adenoma and inflammatory polyps.  They state that they have done well and are having no complaints.    He did have several polyps removed at the time of colonoscopy.    The following portions of the patient's history were reviewed and updated as appropriate: allergies, current medications, past family history, past medical history, past social history, past surgical history and problem list.    Review of Systems   Constitutional: Negative for chills, fever and unexpected weight change.   HENT: Negative for trouble swallowing and voice change.    Eyes: Negative for visual disturbance.   Respiratory: Negative for apnea, cough, chest tightness, shortness of breath and wheezing.    Cardiovascular: Negative for chest pain, palpitations and leg swelling.   Gastrointestinal: Negative for abdominal distention, abdominal pain, anal bleeding, blood in stool, constipation, diarrhea, nausea, rectal pain and vomiting.   Endocrine: Negative for cold intolerance and heat intolerance.   Genitourinary: Negative for difficulty urinating, dysuria, flank pain, scrotal swelling and testicular pain.   Musculoskeletal: Negative for back pain, gait problem and joint swelling.   Skin: Negative for color change, rash and wound.   Neurological: Negative for dizziness, syncope, speech difficulty, weakness, numbness and headaches.   Hematological: Negative for adenopathy. Does not bruise/bleed easily.   Psychiatric/Behavioral: Negative for confusion. The patient is not nervous/anxious.   "      Allergies:  Allergies   Allergen Reactions   • Codeine Sulfate Irritability       Medications:    Current Outpatient Medications:   •  ciprofloxacin (Cipro) 500 MG tablet, Take 1 tablet by mouth 2 (Two) Times a Day., Disp: 20 tablet, Rfl: 0  •  lisinopril (PRINIVIL,ZESTRIL) 20 MG tablet, Take 1 tablet by mouth Daily. Takes 20mg of the morning and 20mg at night, Disp: 90 tablet, Rfl: 3  •  tadalafil (Cialis) 20 MG tablet, Take 1 tablet by mouth Daily As Needed for Erectile Dysfunction., Disp: 20 tablet, Rfl: 5    Vital Signs:  Vitals:    08/23/22 1504   BP: 118/80   BP Location: Right arm   Pulse: 74   Resp: 18   Temp: 97.8 °F (36.6 °C)   TempSrc: Temporal   SpO2: 98%   Weight: 89.4 kg (197 lb)   Height: 182.9 cm (72\")       Physical Exam:   General Appearance:    Alert, cooperative, in no acute distress   Abdomen:     no masses, no organomegaly, soft non-tender, non-distended, no guarding, wounds are well healed, no evidence of recurrent hernia, no peritoneal signs   Chest:      Clear to ausculation            Cor:     Regular rate and rhythm    Results Review:   I reviewed the patient's new clinical results.  I reviewed the patient's new imaging results and agree with the interpretation.  I reviewed the patient's other test results and agree with the interpretation    Assessment / Plan:    1. Adenomatous polyp of colon, unspecified part of colon        I did discuss the situation with the patient today in the office and they have done well from their recent colonoscopy with colon and rectal polypectomy.  I have released the patient back to normal activity.  I need to see the patient back in the office in 3 years and they will need to have repeat colonoscopy at that time.    Electronically signed by Leonard Jenkins MD  08/23/22                "

## 2024-03-11 ENCOUNTER — APPOINTMENT (OUTPATIENT)
Dept: CT IMAGING | Facility: HOSPITAL | Age: 69
End: 2024-03-11
Payer: MEDICARE

## 2024-03-11 ENCOUNTER — HOSPITAL ENCOUNTER (EMERGENCY)
Facility: HOSPITAL | Age: 69
Discharge: HOME | End: 2024-03-11
Attending: STUDENT IN AN ORGANIZED HEALTH CARE EDUCATION/TRAINING PROGRAM | Admitting: STUDENT IN AN ORGANIZED HEALTH CARE EDUCATION/TRAINING PROGRAM
Payer: MEDICARE

## 2024-03-11 VITALS
RESPIRATION RATE: 18 BRPM | SYSTOLIC BLOOD PRESSURE: 167 MMHG | OXYGEN SATURATION: 96 % | TEMPERATURE: 98.1 F | HEART RATE: 70 BPM | HEIGHT: 72 IN | WEIGHT: 197 LBS | BODY MASS INDEX: 26.68 KG/M2 | DIASTOLIC BLOOD PRESSURE: 96 MMHG

## 2024-03-11 DIAGNOSIS — N20.0 NEPHROLITHIASIS: Primary | ICD-10-CM

## 2024-03-11 DIAGNOSIS — N23 RENAL COLIC: ICD-10-CM

## 2024-03-11 LAB
ALBUMIN SERPL-MCNC: 4.7 G/DL (ref 3.5–5.2)
ALBUMIN/GLOB SERPL: 1.6 G/DL
ALP SERPL-CCNC: 85 U/L (ref 39–117)
ALT SERPL W P-5'-P-CCNC: 12 U/L (ref 1–41)
ANION GAP SERPL CALCULATED.3IONS-SCNC: 11 MMOL/L (ref 5–15)
AST SERPL-CCNC: 23 U/L (ref 1–40)
BACTERIA UR QL AUTO: ABNORMAL /HPF
BASOPHILS # BLD AUTO: 0.04 10*3/MM3 (ref 0–0.2)
BASOPHILS NFR BLD AUTO: 0.4 % (ref 0–1.5)
BILIRUB SERPL-MCNC: 1.9 MG/DL (ref 0–1.2)
BILIRUB UR QL STRIP: NEGATIVE
BUN SERPL-MCNC: 22 MG/DL (ref 8–23)
BUN/CREAT SERPL: 15.3 (ref 7–25)
CALCIUM SPEC-SCNC: 9.6 MG/DL (ref 8.6–10.5)
CHLORIDE SERPL-SCNC: 105 MMOL/L (ref 98–107)
CLARITY UR: CLEAR
CO2 SERPL-SCNC: 24 MMOL/L (ref 22–29)
COLOR UR: YELLOW
CREAT SERPL-MCNC: 1.44 MG/DL (ref 0.76–1.27)
CRP SERPL-MCNC: <0.3 MG/DL (ref 0–0.5)
D-LACTATE SERPL-SCNC: 2.2 MMOL/L (ref 0.5–2)
DEPRECATED RDW RBC AUTO: 39.6 FL (ref 37–54)
EGFRCR SERPLBLD CKD-EPI 2021: 53.6 ML/MIN/1.73
EOSINOPHIL # BLD AUTO: 0.06 10*3/MM3 (ref 0–0.4)
EOSINOPHIL NFR BLD AUTO: 0.7 % (ref 0.3–6.2)
ERYTHROCYTE [DISTWIDTH] IN BLOOD BY AUTOMATED COUNT: 12.8 % (ref 12.3–15.4)
GLOBULIN UR ELPH-MCNC: 2.9 GM/DL
GLUCOSE SERPL-MCNC: 166 MG/DL (ref 65–99)
GLUCOSE UR STRIP-MCNC: NEGATIVE MG/DL
HCT VFR BLD AUTO: 49.6 % (ref 37.5–51)
HGB BLD-MCNC: 17.3 G/DL (ref 13–17.7)
HGB UR QL STRIP.AUTO: ABNORMAL
HOLD SPECIMEN: NORMAL
HOLD SPECIMEN: NORMAL
HYALINE CASTS UR QL AUTO: ABNORMAL /LPF
IMM GRANULOCYTES # BLD AUTO: 0.02 10*3/MM3 (ref 0–0.05)
IMM GRANULOCYTES NFR BLD AUTO: 0.2 % (ref 0–0.5)
KETONES UR QL STRIP: NEGATIVE
LEUKOCYTE ESTERASE UR QL STRIP.AUTO: NEGATIVE
LIPASE SERPL-CCNC: 43 U/L (ref 13–60)
LYMPHOCYTES # BLD AUTO: 1.27 10*3/MM3 (ref 0.7–3.1)
LYMPHOCYTES NFR BLD AUTO: 14.3 % (ref 19.6–45.3)
MCH RBC QN AUTO: 29.6 PG (ref 26.6–33)
MCHC RBC AUTO-ENTMCNC: 34.9 G/DL (ref 31.5–35.7)
MCV RBC AUTO: 84.8 FL (ref 79–97)
MONOCYTES # BLD AUTO: 0.46 10*3/MM3 (ref 0.1–0.9)
MONOCYTES NFR BLD AUTO: 5.2 % (ref 5–12)
NEUTROPHILS NFR BLD AUTO: 7.05 10*3/MM3 (ref 1.7–7)
NEUTROPHILS NFR BLD AUTO: 79.2 % (ref 42.7–76)
NITRITE UR QL STRIP: NEGATIVE
NRBC BLD AUTO-RTO: 0 /100 WBC (ref 0–0.2)
PH UR STRIP.AUTO: 6 [PH] (ref 5–8)
PLATELET # BLD AUTO: 166 10*3/MM3 (ref 140–450)
PMV BLD AUTO: 10.6 FL (ref 6–12)
POTASSIUM SERPL-SCNC: 3.9 MMOL/L (ref 3.5–5.2)
PROT SERPL-MCNC: 7.6 G/DL (ref 6–8.5)
PROT UR QL STRIP: NEGATIVE
RBC # BLD AUTO: 5.85 10*6/MM3 (ref 4.14–5.8)
RBC # UR STRIP: ABNORMAL /HPF
REF LAB TEST METHOD: ABNORMAL
SODIUM SERPL-SCNC: 140 MMOL/L (ref 136–145)
SP GR UR STRIP: >=1.03 (ref 1–1.03)
SQUAMOUS #/AREA URNS HPF: ABNORMAL /HPF
UROBILINOGEN UR QL STRIP: ABNORMAL
WBC # UR STRIP: ABNORMAL /HPF
WBC NRBC COR # BLD AUTO: 8.9 10*3/MM3 (ref 3.4–10.8)
WHOLE BLOOD HOLD COAG: NORMAL
WHOLE BLOOD HOLD SPECIMEN: NORMAL

## 2024-03-11 PROCEDURE — 25010000002 ONDANSETRON PER 1 MG: Performed by: STUDENT IN AN ORGANIZED HEALTH CARE EDUCATION/TRAINING PROGRAM

## 2024-03-11 PROCEDURE — 25510000001 IOPAMIDOL 61 % SOLUTION: Performed by: STUDENT IN AN ORGANIZED HEALTH CARE EDUCATION/TRAINING PROGRAM

## 2024-03-11 PROCEDURE — 96375 TX/PRO/DX INJ NEW DRUG ADDON: CPT

## 2024-03-11 PROCEDURE — 25010000002 MORPHINE PER 10 MG: Performed by: STUDENT IN AN ORGANIZED HEALTH CARE EDUCATION/TRAINING PROGRAM

## 2024-03-11 PROCEDURE — 86140 C-REACTIVE PROTEIN: CPT | Performed by: STUDENT IN AN ORGANIZED HEALTH CARE EDUCATION/TRAINING PROGRAM

## 2024-03-11 PROCEDURE — 83690 ASSAY OF LIPASE: CPT | Performed by: STUDENT IN AN ORGANIZED HEALTH CARE EDUCATION/TRAINING PROGRAM

## 2024-03-11 PROCEDURE — 96374 THER/PROPH/DIAG INJ IV PUSH: CPT

## 2024-03-11 PROCEDURE — 81001 URINALYSIS AUTO W/SCOPE: CPT | Performed by: STUDENT IN AN ORGANIZED HEALTH CARE EDUCATION/TRAINING PROGRAM

## 2024-03-11 PROCEDURE — 85025 COMPLETE CBC W/AUTO DIFF WBC: CPT | Performed by: STUDENT IN AN ORGANIZED HEALTH CARE EDUCATION/TRAINING PROGRAM

## 2024-03-11 PROCEDURE — 99285 EMERGENCY DEPT VISIT HI MDM: CPT

## 2024-03-11 PROCEDURE — 25010000002 KETOROLAC TROMETHAMINE PER 15 MG: Performed by: STUDENT IN AN ORGANIZED HEALTH CARE EDUCATION/TRAINING PROGRAM

## 2024-03-11 PROCEDURE — 96376 TX/PRO/DX INJ SAME DRUG ADON: CPT

## 2024-03-11 PROCEDURE — 83605 ASSAY OF LACTIC ACID: CPT | Performed by: STUDENT IN AN ORGANIZED HEALTH CARE EDUCATION/TRAINING PROGRAM

## 2024-03-11 PROCEDURE — 74177 CT ABD & PELVIS W/CONTRAST: CPT

## 2024-03-11 PROCEDURE — 80053 COMPREHEN METABOLIC PANEL: CPT | Performed by: STUDENT IN AN ORGANIZED HEALTH CARE EDUCATION/TRAINING PROGRAM

## 2024-03-11 RX ORDER — OXYCODONE HYDROCHLORIDE AND ACETAMINOPHEN 5; 325 MG/1; MG/1
1 TABLET ORAL ONCE
Status: COMPLETED | OUTPATIENT
Start: 2024-03-11 | End: 2024-03-11

## 2024-03-11 RX ORDER — OXYCODONE HYDROCHLORIDE AND ACETAMINOPHEN 5; 325 MG/1; MG/1
1-2 TABLET ORAL EVERY 6 HOURS PRN
Qty: 15 TABLET | Refills: 0 | Status: SHIPPED | OUTPATIENT
Start: 2024-03-11

## 2024-03-11 RX ORDER — NALOXONE HYDROCHLORIDE 4 MG/.1ML
SPRAY NASAL
Qty: 2 EACH | Refills: 0 | Status: SHIPPED | OUTPATIENT
Start: 2024-03-11 | End: 2024-03-18

## 2024-03-11 RX ORDER — TAMSULOSIN HYDROCHLORIDE 0.4 MG/1
1 CAPSULE ORAL DAILY
Qty: 12 CAPSULE | Refills: 0 | Status: SHIPPED | OUTPATIENT
Start: 2024-03-11

## 2024-03-11 RX ORDER — ONDANSETRON 2 MG/ML
4 INJECTION INTRAMUSCULAR; INTRAVENOUS ONCE
Status: COMPLETED | OUTPATIENT
Start: 2024-03-11 | End: 2024-03-11

## 2024-03-11 RX ORDER — MORPHINE SULFATE 2 MG/ML
2 INJECTION, SOLUTION INTRAMUSCULAR; INTRAVENOUS ONCE
Status: COMPLETED | OUTPATIENT
Start: 2024-03-11 | End: 2024-03-11

## 2024-03-11 RX ORDER — KETOROLAC TROMETHAMINE 10 MG/1
10 TABLET, FILM COATED ORAL EVERY 6 HOURS PRN
Qty: 10 TABLET | Refills: 0 | Status: SHIPPED | OUTPATIENT
Start: 2024-03-11

## 2024-03-11 RX ORDER — SODIUM CHLORIDE 0.9 % (FLUSH) 0.9 %
10 SYRINGE (ML) INJECTION AS NEEDED
Status: DISCONTINUED | OUTPATIENT
Start: 2024-03-11 | End: 2024-03-11 | Stop reason: HOSPADM

## 2024-03-11 RX ORDER — ONDANSETRON 4 MG/1
4 TABLET, ORALLY DISINTEGRATING ORAL EVERY 8 HOURS PRN
Qty: 15 TABLET | Refills: 0 | Status: SHIPPED | OUTPATIENT
Start: 2024-03-11

## 2024-03-11 RX ORDER — KETOROLAC TROMETHAMINE 30 MG/ML
15 INJECTION, SOLUTION INTRAMUSCULAR; INTRAVENOUS ONCE
Status: COMPLETED | OUTPATIENT
Start: 2024-03-11 | End: 2024-03-11

## 2024-03-11 RX ADMIN — OXYCODONE HYDROCHLORIDE AND ACETAMINOPHEN 1 TABLET: 5; 325 TABLET ORAL at 21:39

## 2024-03-11 RX ADMIN — ONDANSETRON 4 MG: 2 INJECTION INTRAMUSCULAR; INTRAVENOUS at 20:49

## 2024-03-11 RX ADMIN — KETOROLAC TROMETHAMINE 15 MG: 30 INJECTION, SOLUTION INTRAMUSCULAR; INTRAVENOUS at 20:49

## 2024-03-11 RX ADMIN — MORPHINE SULFATE 2 MG: 2 INJECTION, SOLUTION INTRAMUSCULAR; INTRAVENOUS at 19:00

## 2024-03-11 RX ADMIN — IOPAMIDOL 100 ML: 612 INJECTION, SOLUTION INTRAVENOUS at 18:56

## 2024-03-11 RX ADMIN — ONDANSETRON 4 MG: 2 INJECTION INTRAMUSCULAR; INTRAVENOUS at 19:00

## 2024-03-11 NOTE — Clinical Note
[facility] EMERGENCY DEPARTMENT  [address] [address]  Phone: [phone]    Reji Corbin was seen and treated in our emergency department on 3/11/2024.  He may return to work on 03/14/2024.         Thank you for choosing [facility].    Daniel Junior MD

## 2024-03-11 NOTE — ED PROVIDER NOTES
Subjective:  History of Present Illness:    Patient is a six 8-year-old male history of hypertension who presents today with right lower quadrant pain.  Reports sudden onset of right lower quadrant pain an hour prior to arrival.  Has had a inguinal hernia repair to the same side but denies any new masses.  Has had no recurrence of his hernia symptoms since that time.  Denies any fevers.  No nausea vomiting diarrhea.  Denies any dysuria.  No chest pain or shortness of breath.  Denies cough or congestion.  Well-appearing on exam.      Nurses Notes reviewed and agree, including vitals, allergies, social history and prior medical history.     REVIEW OF SYSTEMS: All systems reviewed and not pertinent unless noted.  Review of Systems   Constitutional:  Positive for activity change. Negative for appetite change, chills, fatigue and fever.   HENT:  Negative for congestion, sinus pressure, sneezing and trouble swallowing.    Eyes:  Negative for discharge and itching.   Respiratory:  Negative for cough and shortness of breath.    Cardiovascular:  Negative for chest pain and palpitations.   Gastrointestinal:  Positive for abdominal pain. Negative for abdominal distention, anal bleeding, constipation, diarrhea, nausea, rectal pain and vomiting.   Endocrine: Negative for cold intolerance and heat intolerance.   Genitourinary:  Negative for decreased urine volume, dysuria and urgency.   Musculoskeletal:  Negative for gait problem, neck pain and neck stiffness.   Skin:  Negative for color change and rash.   Allergic/Immunologic: Negative for immunocompromised state.   Neurological:  Negative for facial asymmetry and headaches.   Hematological:  Negative for adenopathy.   Psychiatric/Behavioral:  Negative for self-injury and suicidal ideas.        Past Medical History:   Diagnosis Date    Arthritis     Back pain     herniated disks    DDD (degenerative disc disease), lumbosacral     GERD (gastroesophageal reflux disease)     Hard  "of hearing     History of stress test 2000    normal    [place] (hard of hearing)     NO AIDS WORN    Hypertension     Lower back pain     Migraines     CAUSED BY INCREASED BP    Right knee injury     20-25 YEARS AGO       Allergies:    Codeine sulfate      Past Surgical History:   Procedure Laterality Date    COLONOSCOPY      2013    COLONOSCOPY N/A 8/9/2018    Procedure: COLONOSCOPY WITH HOT FORCEP POLYPECTOMY;  Surgeon: Leonard Jenkins MD;  Location: [facility] ENDOSCOPY;  Service: Gastroenterology    INGUINAL HERNIA REPAIR Right 9/11/2018    Procedure: INGUINAL HERNIA  REPAIR WITH MESH;  Surgeon: Leonard Jenkins MD;  Location: [facility] OR;  Service: General    KNEE SURGERY Right     MENISCUS TEAR REPAIRED         Social History     Socioeconomic History    Marital status:    Tobacco Use    Smoking status: Never    Smokeless tobacco: Never   Vaping Use    Vaping status: Never Used   Substance and Sexual Activity    Alcohol use: No    Drug use: No    Sexual activity: Defer         Family History   Problem Relation Age of Onset    Hypertension Mother     Arthritis Mother     Stroke Father        Objective  Physical Exam:  /96 (BP Location: Left arm, Patient Position: Sitting)   Pulse 70   Temp 98.1 °F (36.7 °C) (Oral)   Resp 18   Ht 182.9 cm (72\")   Wt 89.4 kg (197 lb)   SpO2 96%   BMI 26.72 kg/m²      Physical Exam  Constitutional:       General: He is not in acute distress.     Appearance: Normal appearance. He is normal weight. He is not ill-appearing.   HENT:      Head: Normocephalic and atraumatic.      Nose: Nose normal. No congestion or rhinorrhea.      Mouth/Throat:      Mouth: Mucous membranes are moist.      Pharynx: Oropharynx is clear.   Eyes:      Extraocular Movements: Extraocular movements intact.      Conjunctiva/sclera: Conjunctivae normal.      Pupils: Pupils are equal, round, and reactive to light.   Cardiovascular:      Rate and Rhythm: Normal rate and regular rhythm.      Pulses: " Normal pulses.   Pulmonary:      Effort: Pulmonary effort is normal. No respiratory distress.      Breath sounds: Normal breath sounds.   Abdominal:      General: Abdomen is flat. Bowel sounds are normal. There is no distension.      Palpations: Abdomen is soft.      Tenderness: There is abdominal tenderness in the right lower quadrant. There is no right CVA tenderness, left CVA tenderness, guarding or rebound.   Genitourinary:     Penis: Normal.       Testes: Normal.         Right: Tenderness or swelling not present.         Left: Tenderness or swelling not present.      Comments: No concerns for strangulated hernia off my exam, no masses in the groin  Musculoskeletal:         General: No swelling or tenderness. Normal range of motion.      Cervical back: Normal range of motion and neck supple. No rigidity or tenderness.   Skin:     General: Skin is warm and dry.      Capillary Refill: Capillary refill takes less than 2 seconds.   Neurological:      General: No focal deficit present.      Mental Status: He is alert and oriented to person, place, and time. Mental status is at baseline.      Cranial Nerves: No cranial nerve deficit.      Sensory: No sensory deficit.      Motor: No weakness.   Psychiatric:         Mood and Affect: Mood normal.         Behavior: Behavior normal.         Thought Content: Thought content normal.         Judgment: Judgment normal.         Procedures    ED Course:    ED Course as of 03/11/24 2346   Mon Mar 11, 2024   1926 CT imaging reviewed by me prior to radiology overread showing no obvious process [JE]      ED Course User Index  [JE] Daniel Junior MD       Lab Results (last 24 hours)       Procedure Component Value Units Date/Time    CBC & Differential [[id number]]  (Abnormal) Collected: 03/11/24 1808    Specimen: Blood Updated: 03/11/24 1816    Narrative:      The following orders were created for panel order CBC & Differential.  Procedure                                Abnormality         Status                     ---------                               -----------         ------                     CBC Auto Differential[[id number]]        Abnormal            Final result                 Please view results for these tests on the individual orders.    Comprehensive Metabolic Panel [[id number]]  (Abnormal) Collected: 03/11/24 1808    Specimen: Blood Updated: 03/11/24 1838     Glucose 166 mg/dL      BUN 22 mg/dL      Creatinine 1.44 mg/dL      Sodium 140 mmol/L      Potassium 3.9 mmol/L      Chloride 105 mmol/L      CO2 24.0 mmol/L      Calcium 9.6 mg/dL      Total Protein 7.6 g/dL      Albumin 4.7 g/dL      ALT (SGPT) 12 U/L      AST (SGOT) 23 U/L      Alkaline Phosphatase 85 U/L      Total Bilirubin 1.9 mg/dL      Globulin 2.9 gm/dL      A/G Ratio 1.6 g/dL      BUN/Creatinine Ratio 15.3     Anion Gap 11.0 mmol/L      eGFR 53.6 mL/min/1.73     Narrative:      GFR Normal >60  Chronic Kidney Disease <60  Kidney Failure <15      Lipase [[id number]]  (Normal) Collected: 03/11/24 1808    Specimen: Blood Updated: 03/11/24 1838     Lipase 43 U/L     Lactic Acid, Plasma [[id number]]  (Abnormal) Collected: 03/11/24 1808    Specimen: Blood Updated: 03/11/24 1852     Lactate 2.2 mmol/L     CBC Auto Differential [[id number]]  (Abnormal) Collected: 03/11/24 1808    Specimen: Blood Updated: 03/11/24 1816     WBC 8.90 10*3/mm3      RBC 5.85 10*6/mm3      Hemoglobin 17.3 g/dL      Hematocrit 49.6 %      MCV 84.8 fL      MCH 29.6 pg      MCHC 34.9 g/dL      RDW 12.8 %      RDW-SD 39.6 fl      MPV 10.6 fL      Platelets 166 10*3/mm3      Neutrophil % 79.2 %      Lymphocyte % 14.3 %      Monocyte % 5.2 %      Eosinophil % 0.7 %      Basophil % 0.4 %      Immature Grans % 0.2 %      Neutrophils, Absolute 7.05 10*3/mm3      Lymphocytes, Absolute 1.27 10*3/mm3      Monocytes, Absolute 0.46 10*3/mm3      Eosinophils, Absolute 0.06 10*3/mm3      Basophils, Absolute 0.04 10*3/mm3      Immature Grans, Absolute  0.02 10*3/mm3      nRBC 0.0 /100 WBC     C-reactive Protein [[id number]]  (Normal) Collected: 03/11/24 1834    Specimen: Blood Updated: 03/11/24 1905     C-Reactive Protein <0.30 mg/dL     Urinalysis With Culture If Indicated - Urine, Clean Catch [[id number]]  (Abnormal) Collected: 03/11/24 2005    Specimen: Urine, Clean Catch Updated: 03/11/24 2015     Color, UA Yellow     Appearance, UA Clear     pH, UA 6.0     Specific Gravity, UA >=1.030     Glucose, UA Negative     Ketones, UA Negative     Bilirubin, UA Negative     Blood, UA Large (3+)     Protein, UA Negative     Leuk Esterase, UA Negative     Nitrite, UA Negative     Urobilinogen, UA 0.2 E.U./dL    Narrative:      In absence of clinical symptoms, the presence of pyuria, bacteria, and/or nitrites on the urinalysis result does not correlate with infection.    Urinalysis, Microscopic Only - Urine, Clean Catch [[id number]]  (Abnormal) Collected: 03/11/24 2005    Specimen: Urine, Clean Catch Updated: 03/11/24 2028     RBC, UA 21-50 /HPF      WBC, UA None Seen /HPF      Comment: Urine culture not indicated.        Bacteria, UA None Seen /HPF      Squamous Epithelial Cells, UA None Seen /HPF      Hyaline Casts, UA None Seen /LPF      Methodology Manual Light Microscopy             CT Abdomen Pelvis With Contrast    Result Date: 3/11/2024  FINAL REPORT TECHNIQUE: Routine axial images through the abdomen and pelvis were obtained following IV contrast administration. CLINICAL HISTORY: RLQ abdominal pain (Age >= 14y) FINDINGS: Abdomen: The gallbladder is normal.  There is an incidental right renal cyst.  There is right perinephric fat stranding and mild hydroureteronephrosis secondary to a 4.5 mm obstructing calculus 385 Hounsfield units in the distal ureter, several centimeters proximal to the UVJ.  The other solid abdominal organs and left ureter are unremarkable.  The GI tract is unremarkable, including the appendix.  Pelvis: The urinary bladder is unremarkable.    There is no pelvic or abdominal ascites, adenopathy or acute osseous abnormality.     Impression: 4.5 mm mildly obstructing distal right ureteral calculus. Authenticated and Electronically Signed by Jimmy Arvizu M.D. on 03/11/2024 08:12:21 PM        MDM     Amount and/or Complexity of Data Reviewed  Decide to obtain previous medical records or to obtain history from someone other than the patient: yes        Initial impression of presenting illness: Abdominal pain    DDX: includes but is not limited to: Strangulated hernia, incarcerated hernia, appendicitis, gastroenteritis    Patient arrives stable with vitals interpreted by myself.     Pertinent features from physical exam: Clear to auscultation, regular rate and rhythm, no murmur, nontender to palpation at the time my exam, no concern for inguinal hernia recurrence.    Initial diagnostic plan: CBC, CMP, lipase, UA, CRP, lactic acid, CT abdomen pelvis    Results from initial plan were reviewed and interpreted by me revealing patient with 4 and half millimeter kidney stone in the distal ureter    Diagnostic information from other sources: Discussed like the bedside reviewed past medical records    Interventions / Re-evaluation: Given morphine, Zofran, Toradol in the emergency department with resolution of symptoms    Results/clinical rationale were discussed with patient at bedside    Consultations/Discussion of results with other physicians: Diagnosis of nephrolithiasis is etiology of patient's symptoms.  Will give home pain medicine, Flomax, Zofran and given referral to urology for further management.  Given strict turn precaution for severe increase in pain, fever    Disposition plan: Discharge  -----        Final diagnoses:   Nephrolithiasis   Renal colic          Daniel Junior MD  03/11/24 5028

## 2024-03-11 NOTE — Clinical Note
[facility] EMERGENCY DEPARTMENT  [address] [address]  Phone: [phone]    Wife accompanied Reji Corbin to the emergency department on 3/11/2024. They may return to work on 03/13/2024.        Thank you for choosing [facility].    Daniel Junior MD

## 2024-03-12 NOTE — DISCHARGE INSTRUCTIONS
You were evaluated due to Nikolas pain.  We got labs and a CT scan showed that you had kidney stones.  Gave you pain medicine in the emergency department and your condition improved.  You are now stable for discharge.  We have sent the same medicines to your pharmacy to help with pain at home, and also sent a prescription for medicine called Flomax to help you pass the stone and have sent a prescription for Zofran to help with nausea and vomiting.  We recommend that you follow-up with our urologist and we have sent a referral for you to follow-up in their clinic.  Please call them first thing in the morning to schedule an appointment.  If you have severe increase in pain or fever, please connect emergency department for further evaluation.  You are now stable for discharge.

## 2024-03-15 ENCOUNTER — TELEPHONE (OUTPATIENT)
Dept: UROLOGY | Facility: CLINIC | Age: 69
End: 2024-03-15
Payer: MEDICARE

## 2024-03-15 NOTE — TELEPHONE ENCOUNTER
Patient wife called stating they have an appt on Monday with Dr Calero but patient had a bad night with his kidney stone and she was wanting to see if we had any openings today. I advised her we do not that our provider is only here half day today and we have no other providers in this afternoon. She states the discomfort is worse when the patient takes tamsulosin. I advised her that where it is a small stone it'll move around more and cause discomfort until either he passes it or until he has it removed and also advised her he could alternate tylenol and ibuprofen every 6 hrs as needed. She states he has pain medication from the ER. She states that would be here for their appt on Monday with Dr Calero.    ALLIE Rdz

## 2024-03-18 ENCOUNTER — OFFICE VISIT (OUTPATIENT)
Dept: UROLOGY | Facility: CLINIC | Age: 69
End: 2024-03-18
Payer: MEDICARE

## 2024-03-18 VITALS
BODY MASS INDEX: 26.68 KG/M2 | TEMPERATURE: 98.5 F | HEART RATE: 79 BPM | DIASTOLIC BLOOD PRESSURE: 68 MMHG | OXYGEN SATURATION: 94 % | SYSTOLIC BLOOD PRESSURE: 124 MMHG | WEIGHT: 197 LBS | HEIGHT: 72 IN

## 2024-03-18 DIAGNOSIS — N20.0 NEPHROLITHIASIS: Primary | ICD-10-CM

## 2024-03-18 PROCEDURE — 3078F DIAST BP <80 MM HG: CPT | Performed by: UROLOGY

## 2024-03-18 PROCEDURE — 99204 OFFICE O/P NEW MOD 45 MIN: CPT | Performed by: UROLOGY

## 2024-03-18 PROCEDURE — 3074F SYST BP LT 130 MM HG: CPT | Performed by: UROLOGY

## 2024-03-18 RX ORDER — SODIUM CHLORIDE 9 MG/ML
100 INJECTION, SOLUTION INTRAVENOUS CONTINUOUS
Status: CANCELLED | OUTPATIENT
Start: 2024-03-18

## 2024-03-18 RX ORDER — ACETAMINOPHEN 325 MG/1
975 TABLET ORAL ONCE
Status: CANCELLED | OUTPATIENT
Start: 2024-03-18 | End: 2024-03-18

## 2024-03-18 NOTE — PROGRESS NOTES
Chief Complaint  Kidney Stone    Edge, Daniel Hernandes MD    HPI  Mr. Corbin is a 68 y.o. male who presents for further management of nephrolithiasis.    He presented to the ER last week and was diagnosed with a 5mm right ureteral stone. He presents today for follow up.  He is still having nausea.  No fever, chills, nausea, vomiting.  No dysuria.        Past Medical History  Past Medical History:   Diagnosis Date    Arthritis     Back pain     herniated disks    DDD (degenerative disc disease), lumbosacral     GERD (gastroesophageal reflux disease)     Hard of hearing     History of stress test 2000    normal    [place] (hard of hearing)     NO AIDS WORN    Hypertension     Lower back pain     Migraines     CAUSED BY INCREASED BP    Right knee injury     20-25 YEARS AGO       Past Surgical History  Past Surgical History:   Procedure Laterality Date    COLONOSCOPY      2013    COLONOSCOPY N/A 8/9/2018    Procedure: COLONOSCOPY WITH HOT FORCEP POLYPECTOMY;  Surgeon: Leonard Jenkins MD;  Location: [facility] ENDOSCOPY;  Service: Gastroenterology    INGUINAL HERNIA REPAIR Right 9/11/2018    Procedure: INGUINAL HERNIA  REPAIR WITH MESH;  Surgeon: Leonard Jenkins MD;  Location: [facility] OR;  Service: General    KNEE SURGERY Right     MENISCUS TEAR REPAIRED       Medications    Current Outpatient Medications:     ketorolac (TORADOL) 10 MG tablet, Take 1 tablet by mouth Every 6 (Six) Hours As Needed for Mild Pain or Moderate Pain., Disp: 10 tablet, Rfl: 0    lisinopril (PRINIVIL,ZESTRIL) 20 MG tablet, Take 1 tablet by mouth Daily. Takes 20mg of the morning and 20mg at night, Disp: 90 tablet, Rfl: 3    ondansetron ODT (ZOFRAN-ODT) 4 MG disintegrating tablet, Place 1 tablet on the tongue Every 8 (Eight) Hours As Needed for Nausea for up to 15 doses., Disp: 15 tablet, Rfl: 0    oxyCODONE-acetaminophen (Percocet) 5-325 MG per tablet, Take 1-2 tablets by mouth Every 6 (Six) Hours As Needed (pain)., Disp: 15 tablet, Rfl: 0    tamsulosin  "(Flomax) 0.4 MG capsule 24 hr capsule, Take 1 capsule by mouth Daily. Discontinue if stone passes or lightheaded when upright., Disp: 12 capsule, Rfl: 0    ciprofloxacin (Cipro) 500 MG tablet, Take 1 tablet by mouth 2 (Two) Times a Day. (Patient not taking: Reported on 3/18/2024), Disp: 20 tablet, Rfl: 0    naloxone (NARCAN) 4 MG/0.1ML nasal spray, Call 911. Don't prime. [place] in 1 nostril for overdose. Repeat in 2-3 minutes in other nostril if no or minimal breathing/responsiveness., Disp: 2 each, Rfl: 0    tadalafil (Cialis) 20 MG tablet, Take 1 tablet by mouth Daily As Needed for Erectile Dysfunction. (Patient not taking: Reported on 3/18/2024), Disp: 20 tablet, Rfl: 5    Allergies  Allergies   Allergen Reactions    Codeine Sulfate Irritability       Social History  Social History     Socioeconomic History    Marital status:    Tobacco Use    Smoking status: Never    Smokeless tobacco: Never   Vaping Use    Vaping status: Never Used   Substance and Sexual Activity    Alcohol use: No    Drug use: No    Sexual activity: Defer       Family History  Family History   Problem Relation Age of Onset    Hypertension Mother     Arthritis Mother     Stroke Father          Physical Exam  Visit Vitals  /68   Pulse 79   Temp 98.5 °F (36.9 °C)   Ht 183 cm (72.05\")   Wt 89.4 kg (197 lb)   SpO2 94%   BMI 26.68 kg/m²     Physical exam was notable for normal habitus, in pain, nauseated.     Labs  Lab Results   Component Value Date    GLUCOSE 166 (H) 03/11/2024    BUN 22 03/11/2024    CREATININE 1.44 (H) 03/11/2024    EGFRIFNONA 83 12/18/2020    EGFRIFAFRI 93 06/05/2017    BCR 15.3 03/11/2024    K 3.9 03/11/2024    CO2 24.0 03/11/2024    CALCIUM 9.6 03/11/2024    PROTENTOTREF 7.4 07/07/2022    ALBUMIN 4.7 03/11/2024    LABIL2 2.1 07/07/2022    AST 23 03/11/2024    ALT 12 03/11/2024       Lab Results   Component Value Date    WBC 8.90 03/11/2024    HGB 17.3 03/11/2024    HCT 49.6 03/11/2024    MCV 84.8 03/11/2024    PLT " "166 03/11/2024       No results found for: \"LDH\", \"URICACID\"    Lab Results   Component Value Date    CALCIUM 9.6 03/11/2024       Brief Urine Lab Results  (Last result in the past 365 days)        Color   Clarity   Blood   Leuk Est   Nitrite   Protein   CREAT   Urine HCG        03/11/24 2005 Yellow   Clear   Large (3+)   Negative   Negative   Negative                   No results found for: \"URINECX\"    )No components found for: \"STONEANALYSI\"      Radiologic Studies  CT Abdomen Pelvis With Contrast  Result Date: 3/11/2024  4.5 mm mildly obstructing distal right ureteral calculus. Authenticated and Electronically Signed by Jimmy Arvizu M.D. on 03/11/2024 08:12:21 PM      I have personally reviewed these labs and images.      Assessment  Mr. Corbin is a 68 y.o. male with 5mm right distal ureteral stone.    We had informed discussion about the causes of stones, in the main treatments for nephrolithiasis.  The 3 main surgical treatments for stones are percutaneous nephrolithotomy, ureteroscopy and laser lithotripsy, and shockwave lithotripsy.  Based on patient factors and the stone size and location, I have recommended URS due to his pain and nausea.  We discussed the risks, benefits, and alternatives to this therapy.  The main risks that we discussed were bleeding, urinary infection, damage to nearby structures, need for further procedures, and cardiopulmonary complications from anesthesia.  The patient voiced understanding and wished to proceed.     Plan  1. Schedule for urgent right URS/HLL stent.               "

## 2024-03-20 ENCOUNTER — ANESTHESIA EVENT (OUTPATIENT)
Dept: PERIOP | Facility: HOSPITAL | Age: 69
End: 2024-03-20
Payer: MEDICARE

## 2024-03-20 ENCOUNTER — HOSPITAL ENCOUNTER (OUTPATIENT)
Facility: HOSPITAL | Age: 69
Setting detail: HOSPITAL OUTPATIENT SURGERY
Discharge: HOME | End: 2024-03-20
Attending: UROLOGY | Admitting: UROLOGY
Payer: MEDICARE

## 2024-03-20 ENCOUNTER — ANESTHESIA (OUTPATIENT)
Dept: PERIOP | Facility: HOSPITAL | Age: 69
End: 2024-03-20
Payer: MEDICARE

## 2024-03-20 VITALS
DIASTOLIC BLOOD PRESSURE: 88 MMHG | OXYGEN SATURATION: 95 % | TEMPERATURE: 98.3 F | RESPIRATION RATE: 16 BRPM | SYSTOLIC BLOOD PRESSURE: 158 MMHG | HEART RATE: 70 BPM

## 2024-03-20 DIAGNOSIS — N20.0 NEPHROLITHIASIS: ICD-10-CM

## 2024-03-20 PROCEDURE — 25010000002 FENTANYL CITRATE (PF) 50 MCG/ML SOLUTION PREFILLED SYRINGE: Performed by: NURSE ANESTHETIST, CERTIFIED REGISTERED

## 2024-03-20 PROCEDURE — 25510000001 IOPAMIDOL 61 % SOLUTION: Performed by: UROLOGY

## 2024-03-20 PROCEDURE — 82365 CALCULUS SPECTROSCOPY: CPT | Performed by: UROLOGY

## 2024-03-20 PROCEDURE — 25810000003 SODIUM CHLORIDE 0.9 % SOLUTION: Performed by: UROLOGY

## 2024-03-20 PROCEDURE — 25010000002 ONDANSETRON PER 1 MG: Performed by: NURSE ANESTHETIST, CERTIFIED REGISTERED

## 2024-03-20 PROCEDURE — C1769 GUIDE WIRE: HCPCS | Performed by: UROLOGY

## 2024-03-20 PROCEDURE — 74420 UROGRAPHY RTRGR +-KUB: CPT | Performed by: UROLOGY

## 2024-03-20 PROCEDURE — 25010000002 PROPOFOL 10 MG/ML EMULSION: Performed by: NURSE ANESTHETIST, CERTIFIED REGISTERED

## 2024-03-20 PROCEDURE — 25010000002 METOCLOPRAMIDE PER 10 MG: Performed by: NURSE ANESTHETIST, CERTIFIED REGISTERED

## 2024-03-20 PROCEDURE — 25010000002 KETOROLAC TROMETHAMINE PER 15 MG: Performed by: NURSE ANESTHETIST, CERTIFIED REGISTERED

## 2024-03-20 PROCEDURE — 52356 CYSTO/URETERO W/LITHOTRIPSY: CPT | Performed by: UROLOGY

## 2024-03-20 PROCEDURE — S0260 H&P FOR SURGERY: HCPCS | Performed by: UROLOGY

## 2024-03-20 PROCEDURE — 0 CEFAZOLIN SODIUM 2 G RECONSTITUTED SOLUTION: Performed by: UROLOGY

## 2024-03-20 PROCEDURE — C2617 STENT, NON-COR, TEM W/O DEL: HCPCS | Performed by: UROLOGY

## 2024-03-20 PROCEDURE — 25010000002 DEXAMETHASONE PER 1 MG: Performed by: NURSE ANESTHETIST, CERTIFIED REGISTERED

## 2024-03-20 PROCEDURE — C1758 CATHETER, URETERAL: HCPCS | Performed by: UROLOGY

## 2024-03-20 DEVICE — URETERAL STENT
Type: IMPLANTABLE DEVICE | Site: URETER | Status: FUNCTIONAL
Brand: CONTOUR™

## 2024-03-20 RX ORDER — ACETAMINOPHEN 500 MG
1000 TABLET ORAL EVERY 6 HOURS
Qty: 30 TABLET | Refills: 0 | Status: SHIPPED | OUTPATIENT
Start: 2024-03-20 | End: 2024-03-23

## 2024-03-20 RX ORDER — ONDANSETRON 2 MG/ML
INJECTION INTRAMUSCULAR; INTRAVENOUS AS NEEDED
Status: DISCONTINUED | OUTPATIENT
Start: 2024-03-20 | End: 2024-03-20 | Stop reason: SURG

## 2024-03-20 RX ORDER — PROPOFOL 10 MG/ML
VIAL (ML) INTRAVENOUS AS NEEDED
Status: DISCONTINUED | OUTPATIENT
Start: 2024-03-20 | End: 2024-03-20 | Stop reason: SURG

## 2024-03-20 RX ORDER — KETOROLAC TROMETHAMINE 30 MG/ML
INJECTION, SOLUTION INTRAMUSCULAR; INTRAVENOUS AS NEEDED
Status: DISCONTINUED | OUTPATIENT
Start: 2024-03-20 | End: 2024-03-20 | Stop reason: SURG

## 2024-03-20 RX ORDER — DOCUSATE SODIUM 100 MG/1
100 CAPSULE, LIQUID FILLED ORAL 2 TIMES DAILY
Qty: 15 CAPSULE | Refills: 1 | Status: SHIPPED | OUTPATIENT
Start: 2024-03-20

## 2024-03-20 RX ORDER — BUPIVACAINE HCL/0.9 % NACL/PF 0.1 %
2000 PLASTIC BAG, INJECTION (ML) EPIDURAL ONCE
Status: COMPLETED | OUTPATIENT
Start: 2024-03-20 | End: 2024-03-20

## 2024-03-20 RX ORDER — OXYCODONE HYDROCHLORIDE 5 MG/1
5 TABLET ORAL EVERY 6 HOURS PRN
Qty: 5 TABLET | Refills: 0 | Status: SHIPPED | OUTPATIENT
Start: 2024-03-20

## 2024-03-20 RX ORDER — METOCLOPRAMIDE HYDROCHLORIDE 5 MG/ML
INJECTION INTRAMUSCULAR; INTRAVENOUS AS NEEDED
Status: DISCONTINUED | OUTPATIENT
Start: 2024-03-20 | End: 2024-03-20 | Stop reason: SURG

## 2024-03-20 RX ORDER — ACETAMINOPHEN 325 MG/1
975 TABLET ORAL ONCE
Status: COMPLETED | OUTPATIENT
Start: 2024-03-20 | End: 2024-03-20

## 2024-03-20 RX ORDER — IPRATROPIUM BROMIDE AND ALBUTEROL SULFATE 2.5; .5 MG/3ML; MG/3ML
3 SOLUTION RESPIRATORY (INHALATION) ONCE
Status: DISCONTINUED | OUTPATIENT
Start: 2024-03-20 | End: 2024-03-20 | Stop reason: HOSPADM

## 2024-03-20 RX ORDER — TAMSULOSIN HYDROCHLORIDE 0.4 MG/1
1 CAPSULE ORAL NIGHTLY
Qty: 10 CAPSULE | Refills: 0 | Status: SHIPPED | OUTPATIENT
Start: 2024-03-20

## 2024-03-20 RX ORDER — PHENAZOPYRIDINE HYDROCHLORIDE 100 MG/1
100 TABLET, FILM COATED ORAL DAILY PRN
Qty: 3 TABLET | Refills: 0 | Status: SHIPPED | OUTPATIENT
Start: 2024-03-20 | End: 2024-03-23

## 2024-03-20 RX ORDER — SODIUM CHLORIDE 9 MG/ML
100 INJECTION, SOLUTION INTRAVENOUS CONTINUOUS
Status: DISCONTINUED | OUTPATIENT
Start: 2024-03-20 | End: 2024-03-20 | Stop reason: HOSPADM

## 2024-03-20 RX ORDER — OXYBUTYNIN CHLORIDE 10 MG/1
10 TABLET, EXTENDED RELEASE ORAL DAILY PRN
Qty: 10 TABLET | Refills: 0 | Status: SHIPPED | OUTPATIENT
Start: 2024-03-20

## 2024-03-20 RX ORDER — EPHEDRINE SULFATE 5 MG/ML
INJECTION INTRAVENOUS AS NEEDED
Status: DISCONTINUED | OUTPATIENT
Start: 2024-03-20 | End: 2024-03-20 | Stop reason: SURG

## 2024-03-20 RX ORDER — DEXAMETHASONE SODIUM PHOSPHATE 4 MG/ML
INJECTION, SOLUTION INTRA-ARTICULAR; INTRALESIONAL; INTRAMUSCULAR; INTRAVENOUS; SOFT TISSUE AS NEEDED
Status: DISCONTINUED | OUTPATIENT
Start: 2024-03-20 | End: 2024-03-20 | Stop reason: SURG

## 2024-03-20 RX ORDER — MIDAZOLAM HYDROCHLORIDE 2 MG/2ML
2 INJECTION, SOLUTION INTRAMUSCULAR; INTRAVENOUS ONCE
Status: DISCONTINUED | OUTPATIENT
Start: 2024-03-20 | End: 2024-03-20 | Stop reason: HOSPADM

## 2024-03-20 RX ORDER — SULFAMETHOXAZOLE AND TRIMETHOPRIM 800; 160 MG/1; MG/1
1 TABLET ORAL 2 TIMES DAILY
Qty: 6 TABLET | Refills: 0 | Status: SHIPPED | OUTPATIENT
Start: 2024-03-20

## 2024-03-20 RX ORDER — KETAMINE HCL IN NACL, ISO-OSM 100MG/10ML
SYRINGE (ML) INJECTION AS NEEDED
Status: DISCONTINUED | OUTPATIENT
Start: 2024-03-20 | End: 2024-03-20 | Stop reason: SURG

## 2024-03-20 RX ORDER — FENTANYL CITRATE 50 UG/ML
INJECTION, SOLUTION INTRAMUSCULAR; INTRAVENOUS AS NEEDED
Status: DISCONTINUED | OUTPATIENT
Start: 2024-03-20 | End: 2024-03-20 | Stop reason: SURG

## 2024-03-20 RX ORDER — LORAZEPAM 2 MG/ML
1 INJECTION INTRAMUSCULAR EVERY 4 HOURS PRN
Status: DISCONTINUED | OUTPATIENT
Start: 2024-03-20 | End: 2024-03-20 | Stop reason: HOSPADM

## 2024-03-20 RX ORDER — ONDANSETRON 2 MG/ML
4 INJECTION INTRAMUSCULAR; INTRAVENOUS ONCE AS NEEDED
Status: DISCONTINUED | OUTPATIENT
Start: 2024-03-20 | End: 2024-03-20 | Stop reason: HOSPADM

## 2024-03-20 RX ORDER — SCOLOPAMINE TRANSDERMAL SYSTEM 1 MG/1
1 PATCH, EXTENDED RELEASE TRANSDERMAL ONCE
Status: DISCONTINUED | OUTPATIENT
Start: 2024-03-20 | End: 2024-03-20 | Stop reason: HOSPADM

## 2024-03-20 RX ORDER — MEPERIDINE HYDROCHLORIDE 25 MG/ML
25 INJECTION INTRAMUSCULAR; INTRAVENOUS; SUBCUTANEOUS
Status: DISCONTINUED | OUTPATIENT
Start: 2024-03-20 | End: 2024-03-20 | Stop reason: HOSPADM

## 2024-03-20 RX ADMIN — EPHEDRINE SULFATE 20 MG: 5 INJECTION INTRAVENOUS at 12:13

## 2024-03-20 RX ADMIN — PROPOFOL 200 MG: 10 INJECTION, EMULSION INTRAVENOUS at 12:04

## 2024-03-20 RX ADMIN — SODIUM CHLORIDE 100 ML/HR: 9 INJECTION, SOLUTION INTRAVENOUS at 11:39

## 2024-03-20 RX ADMIN — LIDOCAINE HYDROCHLORIDE 100 MG: 20 INJECTION, SOLUTION INTRAVENOUS at 12:04

## 2024-03-20 RX ADMIN — ONDANSETRON 4 MG: 2 INJECTION INTRAMUSCULAR; INTRAVENOUS at 12:04

## 2024-03-20 RX ADMIN — EPHEDRINE SULFATE 5 MG: 5 INJECTION INTRAVENOUS at 12:24

## 2024-03-20 RX ADMIN — ACETAMINOPHEN 975 MG: 325 TABLET, FILM COATED ORAL at 11:41

## 2024-03-20 RX ADMIN — DEXAMETHASONE SODIUM PHOSPHATE 10 MG: 4 INJECTION, SOLUTION INTRA-ARTICULAR; INTRALESIONAL; INTRAMUSCULAR; INTRAVENOUS; SOFT TISSUE at 12:04

## 2024-03-20 RX ADMIN — SCOPOLAMINE 1 PATCH: 1.5 PATCH, EXTENDED RELEASE TRANSDERMAL at 11:43

## 2024-03-20 RX ADMIN — Medication 50 MG: at 12:04

## 2024-03-20 RX ADMIN — Medication 3 G: at 12:08

## 2024-03-20 RX ADMIN — METOCLOPRAMIDE 10 MG: 5 INJECTION, SOLUTION INTRAMUSCULAR; INTRAVENOUS at 12:04

## 2024-03-20 RX ADMIN — FENTANYL CITRATE 100 MCG: 50 INJECTION, SOLUTION INTRAMUSCULAR; INTRAVENOUS at 12:04

## 2024-03-20 RX ADMIN — KETOROLAC TROMETHAMINE 30 MG: 30 INJECTION, SOLUTION INTRAMUSCULAR; INTRAVENOUS at 12:04

## 2024-03-20 NOTE — ANESTHESIA PREPROCEDURE EVALUATION
Anesthesia Evaluation     Patient summary reviewed and Nursing notes reviewed   history of anesthetic complications:  PONV  NPO Solid Status: > 8 hours  NPO Liquid Status: > 8 hours           Airway   Mallampati: II  TM distance: >3 FB  Neck ROM: full  No difficulty expected  Dental - normal exam     Pulmonary - negative pulmonary ROS and normal exam   Cardiovascular - normal exam  Exercise tolerance: good (4-7 METS)    ECG reviewed    (+) hypertension well controlled less than 2 medications, hyperlipidemia    ROS comment: EKG: Sinus bradycardia    Neuro/Psych  (+) headaches (Migraines)  GI/Hepatic/Renal/Endo    (+) GERD well controlled, renal disease- stones    Musculoskeletal     (+) back pain (herniated disc, DDD)  Abdominal    Substance History      OB/GYN          Other   arthritis,     ROS/Med Hx Other: 17.3/49.6  K 3.9              Anesthesia Plan    ASA 3     general     (Risks and benefits discussed including risk of aspiration, recall and dental damage. All patient questions answered. Will continue with POC.)  intravenous induction     Anesthetic plan, risks, benefits, and alternatives have been provided, discussed and informed consent has been obtained with: patient.    Plan discussed with CRNA.    CODE STATUS:

## 2024-03-20 NOTE — OP NOTE
Preoperative diagnosis  right ureteral stone    Postoperative diagnosis  right ureteral stone    Procedure performed  1.  Flexible cystoscopy  2.  right retrograde pyelogram  3.  right ureteroscopy with holmium-YAG laser lithotripsy and basket extraction of stone fragments ([id number])  4.  right ureteral stent placement 6 Fr x 28 cm  5.  Fluoroscopy time < 1 hour    Surgeon  Royal Calero MD    Anesthesia  General    Complications  None    Specimen  Stone fragments for biochemical analysis    EBL  Minimal    Findings  Cystoscopy revealed no tumors, stones or other mucosal abnormalities.  Retrograde pyelogram revealed a delicate system with identification of the stones seen on imaging.    Ureteroscopy revealed right distal stone.     Indications  68 y.o. male with a history of above agreed to undergo the above named procedure after discussion of the alternatives, risks and benefits. Informed consent was obtained.      Procedure  The patient was taken to the operating room and placed supine on the operating table.  Pre-operative antibiotics were administered.  Bilateral lower extremity SCDs were placed.  After induction of general anesthesia the patient was positioned in dorsal lithotomy, prepped and draped in a sterile fashion.  A time-out was performed.      A 14 Lithuanian flexible cystoscope was passed carefully via urethra into the bladder.  The right ureteral orifice was identified and a Sensor wire was passed retrograde to the level of the kidney and confirmed by fluoroscopy.  The flexible scope was off-loaded and the bladder emptied with a straight catheter.  An 8-10 coaxial dilator was passed without difficulty and then removed.  The semirigid ureteroscope was passed carefully along the Sensor wire through the urethra and into the distal ureter.  The stone was encountered and fragmented with a 365-micron holmium YAG laser fiber at laser settings of 0.2 Joules and a frequency of 50 Hz.  The fragments were basket  extracted.  At the completion of the procedure, all clinically significant fragments were removed from the ureter and only dust-like fragments remained.  The ureteroscope was withdrawn.  A 5-Macedonian open-ended was passed over the wire and the wire removed.  A retrograde pyelogram was performed by slowly injecting 5 mL of 50% Omnipaque contrast.  A 6 Fr x 28 cm stent was positioned with the upper end in the upper pole and the lower in the bladder confirmed by fluoroscopy. The bladder was emptied and the procedure was complete. The patient tolerated the procedure well and was stable throughout.    The patient will follow up with me in 1 week for stent removal .

## 2024-03-20 NOTE — ANESTHESIA PROCEDURE NOTES
Airway  Urgency: elective    Date/Time: 3/20/2024 12:05 PM  Airway not difficult    General Information and Staff    Patient location during procedure: OR  CRNA/CAA: Vin Hannah CRNA    Indications and Patient Condition  Indications for airway management: airway protection    Preoxygenated: yes  Mask difficulty assessment: 1 - vent by mask    Final Airway Details  Final airway type: supraglottic airway      Successful airway: unique  Size 4     Number of attempts at approach: 1  Assessment: lips, teeth, and gum same as pre-op and atraumatic intubation

## 2024-03-20 NOTE — ANESTHESIA POSTPROCEDURE EVALUATION
Patient: Reji Corbin    Procedure Summary       Date: 03/20/24 Room / Location: [facility] /  [facility] OR    Anesthesia Start: 1156 Anesthesia Stop: 1238    Procedure: URETEROSCOPY LASER LITHOTRIPSY, retro grade WITH STENT INSERTION (Right) Diagnosis:       Nephrolithiasis      (Nephrolithiasis [N20.0])    Surgeons: Royal Calero MD Provider: Vin Hannah CRNA    Anesthesia Type: general ASA Status: 3            Anesthesia Type: general    Vitals  No vitals data found for the desired time range.          Post Anesthesia Care and Evaluation    Patient location during evaluation: PACU  Patient participation: complete - patient participated  Level of consciousness: awake and alert  Pain score: 2  Pain management: satisfactory to patient    Airway patency: patent  Anesthetic complications: No anesthetic complications  PONV Status: none  Cardiovascular status: acceptable and stable  Respiratory status: acceptable  Hydration status: acceptable    Comments: Vitals signs as noted in nursing documentation as per protocol.

## 2024-03-20 NOTE — H&P
CC  No chief complaint on file.       HPI  Reji Corbin is a 68 y.o. with history of No diagnosis found.     No recent fevers or new LUTS  Does not take any blood thinners    Past Medical History  Past Medical History:   Diagnosis Date    Arthritis     Back pain     herniated disks    DDD (degenerative disc disease), lumbosacral     GERD (gastroesophageal reflux disease)     Hard of hearing     History of stress test 2000    normal    [place] (hard of hearing)     NO AIDS WORN    [place] (hard of hearing)     Hypertension     Kidney stones     Lower back pain     Migraines     CAUSED BY INCREASED BP    PONV (postoperative nausea and vomiting)     Right knee injury     20-25 YEARS AGO       Past Surgical History  Past Surgical History:   Procedure Laterality Date    COLONOSCOPY      2013    COLONOSCOPY N/A 8/9/2018    Procedure: COLONOSCOPY WITH HOT FORCEP POLYPECTOMY;  Surgeon: Leonard Jenkins MD;  Location: [facility] ENDOSCOPY;  Service: Gastroenterology    INGUINAL HERNIA REPAIR Right 9/11/2018    Procedure: INGUINAL HERNIA  REPAIR WITH MESH;  Surgeon: Leonard Jenkins MD;  Location: [facility] OR;  Service: General    KNEE SURGERY Right     MENISCUS TEAR REPAIRED       Medications  No current facility-administered medications for this encounter.    Allergies  Allergies   Allergen Reactions    Codeine Sulfate Irritability       Social History  Social History     Socioeconomic History    Marital status:    Tobacco Use    Smoking status: Never    Smokeless tobacco: Never   Vaping Use    Vaping status: Never Used   Substance and Sexual Activity    Alcohol use: No    Drug use: No    Sexual activity: Defer       Review of Systems  Constitutional: No fevers or chills  Skin: Negative for rash  Endocrine: No heat/cold intolerance   Cardiovascular: Negative for chest pain or dyspnea on exertion  Respiratory: Negative for shortness of breath or wheezing  Gastrointestinal: No constipation, nausea or vomiting  Genitourinary:  Negative for new lower urinary tract symptoms, current gross hematuria or dysuria.  Musculoskeletal: No flank pain  Neurological:  Negative for frequent headaches or dizziness  Lymph/Heme: Negative for leg swelling or calf pain.    Physical Exam  There were no vitals taken for this visit.  Constitutional: NAD, WDWN.   HEENT: NCAT. Conjunctivae normal.  MMM.    Cardiovascular: Regular rate.  Pulmonary/Chest: Respirations are even and unlabored bilaterally.  Abdominal: Soft. No distension, tenderness, masses or guarding. No CVA tenderness.  Neurological: A + O x 3.  Cranial Nerves II-XII grossly intact. Normal gait.  Extremities: IDALIA x 4, Warm. No clubbing.  No cyanosis.    Skin: Pink, warm and dry.  No rashes noted.  Psychiatric:  Normal mood and affect    Labs & Imaging  Lab Results   Component Value Date    GLUCOSE 166 (H) 03/11/2024    CALCIUM 9.6 03/11/2024     03/11/2024    K 3.9 03/11/2024    CO2 24.0 03/11/2024     03/11/2024    BUN 22 03/11/2024    CREATININE 1.44 (H) 03/11/2024    EGFRIFAFRI 93 06/05/2017    EGFRIFNONA 83 12/18/2020    BCR 15.3 03/11/2024    ANIONGAP 11.0 03/11/2024     Lab Results   Component Value Date    WBC 8.90 03/11/2024    HGB 17.3 03/11/2024    HCT 49.6 03/11/2024    MCV 84.8 03/11/2024     03/11/2024     Brief Urine Lab Results  (Last result in the past 365 days)        Color   Clarity   Blood   Leuk Est   Nitrite   Protein   CREAT   Urine HCG        03/11/24 2005 Yellow   Clear   Large (3+)   Negative   Negative   Negative                      CT Abdomen Pelvis With Contrast    Result Date: 3/11/2024  FINAL REPORT TECHNIQUE: Routine axial images through the abdomen and pelvis were obtained following IV contrast administration. CLINICAL HISTORY: RLQ abdominal pain (Age >= 14y) FINDINGS: Abdomen: The gallbladder is normal.  There is an incidental right renal cyst.  There is right perinephric fat stranding and mild hydroureteronephrosis secondary to a 4.5 mm  obstructing calculus 385 Hounsfield units in the distal ureter, several centimeters proximal to the UVJ.  The other solid abdominal organs and left ureter are unremarkable.  The GI tract is unremarkable, including the appendix.  Pelvis: The urinary bladder is unremarkable.   There is no pelvic or abdominal ascites, adenopathy or acute osseous abnormality.     4.5 mm mildly obstructing distal right ureteral calculus. Authenticated and Electronically Signed by Jimmy Arvizu M.D. on 03/11/2024 08:12:21 PM          Assessment  Reji Corbin is a 68 y.o. male who presents with the following diagnosis:  No diagnosis found.     Plan  1. To OR for URETEROSCOPY LASER LITHOTRIPSY WITH STENT INSERTION right    Royal Calero MD

## 2024-03-20 NOTE — DISCHARGE INSTRUCTIONS
Home Care After Ureteroscopy and Laser Lithotripsy  The following instructions will help you care for yourself, or be cared for upon your return home today.    These are guidelines for your care right after surgery only.     Diet  Drink plenty of liquids and eat light meals today.    Start your regular diet tomorrow.    Activity  Start normal activities in twenty-four (24) hours.    Wound Care and Hygiene  No restrictions, start normal routine.    Anesthesia Precautions & Expectations  After anesthesia, rest for 24 hours.    Do not drive, drink alcoholic beverages or make any important decisions during this time.  General anesthesia may cause a sore throat, jaw discomfort or muscle aches.    These symptoms can last for one or two days.     What to Expect after Surgery  Mild pain with voiding.  Frequency or urgency.  Bladder cramps.  Minimal bleeding with voiding.    Call your Doctor  Passing clots in urine preventing bladder emptying  Severe pain not controlled by oral medication  Temperature above 101.5 degrees  Inability to urinate within eight (8) hours after surgery    After Stent Placement  It is common to have blood tinged urine for 3-5 days.  It is common to have pain in your side and in your back when you urinate for 3-5 days.  It is common to have urgency with urination.  This is a temporary stent and will need to be removed in the office in 1 week.  Do not take the Pyridium 24 hours prior to your stent removal.    Other Contacts  Urology Office:  [id number]   [place] [address] [phone] office  [phone] fax    Other Instructions  Take tamsulosin daily until the stent comes out.  Take oxybutynin daily as needed for bladder spasm while the stent is in.  Take Pyridium up to 3 times daily as needed for burning with urination.   Take Tylenol scheduled every 6 hours for the first 3 days.  Take the oxycodone on top of that as needed.  Take all of the antibiotics.     Follow up  Appointment  1 week for stent removal. Please See After visit Summary. Call if you do not have an appt already scheduled.         No pushing, pulling, tugging,  heavy lifting, or strenuous activity.  No major decision making, driving, or drinking alcoholic beverages for 24 hours. ( due to the medications you have  received)  Always use good hand hygiene/washing techniques.  NO driving while taking pain medications.    * if you have an incision:  Check your incision area every day for signs of infection.   Check for:  * more redness, swelling, or pain  *more fluid or blood  *warmth  *pus or bad smellTo assist you in voiding:  Drink plenty of fluids  Listen to running water while attempting to void.    If you are unable to urinate and you have an uncomfortable urge to void or it has been   6 hours since you were discharged, return to the Emergency Room

## 2024-03-27 LAB
CALCIUM OXALATE DIHYDRATE MFR STONE IR: 30 %
COLOR STONE: NORMAL
COM MFR STONE: 70 %
COMPN STONE: NORMAL
LABORATORY COMMENT REPORT: NORMAL
Lab: NORMAL
Lab: NORMAL
PHOTO: NORMAL
SIZE STONE: NORMAL MM
SPEC SOURCE SUBJ: NORMAL
WT STONE: 21 MG

## 2024-03-28 ENCOUNTER — PROCEDURE VISIT (OUTPATIENT)
Dept: UROLOGY | Facility: CLINIC | Age: 69
End: 2024-03-28
Payer: MEDICARE

## 2024-03-28 VITALS — WEIGHT: 205 LBS | HEIGHT: 72 IN | RESPIRATION RATE: 17 BRPM | BODY MASS INDEX: 27.77 KG/M2

## 2024-03-28 DIAGNOSIS — N20.0 NEPHROLITHIASIS: Primary | ICD-10-CM

## 2024-03-28 NOTE — PROGRESS NOTES
Preoperative diagnosis  Foreign body in genitourinary tract    Postoperative diagnosis  Foreign body in genitourinary tract    Procedure  Flexible cystourethroscopy with stent removal    Attending surgeon  Royal Calero MD    Anesthesia  2% lidocaine jelly intraurethrally    Complications  None    Indications  68 y.o. male who is status post ureteroscopy with laser lithotripsy who presents for stent removal.    Procedure  Detailed information of all possible complications and side effects were discussed with the patient.  Informed consent was obtained. Patient was given one dose of antibiotics. The patient was placed in supine position and a timeout was performed. The patient was prepped and draped in sterile fashion.  Next, 2% lidocaine jelly was bluntly injected per urethra without difficulty. The 14 [place] flexible cystoscope was passed through the urethra and into the bladder.  The stent was visualized, grasped and removed in its entirety.  The patient tolerated the procedure well.    Plan  1. Provided education regarding water intake of at least 2 liters per day  2. F/u in 8 weeks with a renal ultrasound with YUE

## 2024-05-31 ENCOUNTER — HOSPITAL ENCOUNTER (OUTPATIENT)
Dept: ULTRASOUND IMAGING | Facility: HOSPITAL | Age: 69
Discharge: HOME | End: 2024-05-31
Payer: MEDICARE

## 2024-05-31 DIAGNOSIS — N20.0 NEPHROLITHIASIS: ICD-10-CM

## 2024-05-31 PROCEDURE — 76775 US EXAM ABDO BACK WALL LIM: CPT

## 2024-06-04 ENCOUNTER — OFFICE VISIT (OUTPATIENT)
Dept: UROLOGY | Facility: CLINIC | Age: 69
End: 2024-06-04
Payer: MEDICARE

## 2024-06-04 VITALS
DIASTOLIC BLOOD PRESSURE: 64 MMHG | HEART RATE: 61 BPM | BODY MASS INDEX: 27.77 KG/M2 | SYSTOLIC BLOOD PRESSURE: 128 MMHG | WEIGHT: 205 LBS | OXYGEN SATURATION: 97 % | TEMPERATURE: 98.2 F | HEIGHT: 72 IN

## 2024-06-04 DIAGNOSIS — N20.0 NEPHROLITHIASIS: Primary | ICD-10-CM

## 2024-06-04 LAB
BILIRUB BLD-MCNC: NEGATIVE MG/DL
CLARITY, POC: CLEAR
COLOR UR: YELLOW
EXPIRATION DATE: ABNORMAL
GLUCOSE UR STRIP-MCNC: NEGATIVE MG/DL
KETONES UR QL: NEGATIVE
LEUKOCYTE EST, POC: NEGATIVE
Lab: ABNORMAL
NITRITE UR-MCNC: NEGATIVE MG/ML
PH UR: 5.5 [PH] (ref 5–8)
PROT UR STRIP-MCNC: ABNORMAL MG/DL
RBC # UR STRIP: NEGATIVE /UL
SP GR UR: 1.02 (ref 1–1.03)
UROBILINOGEN UR QL: NORMAL

## 2024-06-04 PROCEDURE — 3078F DIAST BP <80 MM HG: CPT | Performed by: NURSE PRACTITIONER

## 2024-06-04 PROCEDURE — 3074F SYST BP LT 130 MM HG: CPT | Performed by: NURSE PRACTITIONER

## 2024-06-04 PROCEDURE — 1160F RVW MEDS BY RX/DR IN RCRD: CPT | Performed by: NURSE PRACTITIONER

## 2024-06-04 PROCEDURE — 81003 URINALYSIS AUTO W/O SCOPE: CPT | Performed by: NURSE PRACTITIONER

## 2024-06-04 PROCEDURE — 99213 OFFICE O/P EST LOW 20 MIN: CPT | Performed by: NURSE PRACTITIONER

## 2024-06-04 PROCEDURE — 1159F MED LIST DOCD IN RCRD: CPT | Performed by: NURSE PRACTITIONER

## 2024-06-04 NOTE — PROGRESS NOTES
Office Visit Established Male Patient     Patient Name: Reji Corbin  : 1955   MRN: [id number]     Chief Complaint:   Chief Complaint   Patient presents with    Follow-up     ARMANDO  Still having some lower abdominal pain         History of Present Illness: Mr. Reji Corbin is a 68 y.o. male who presents today for follow up for nephrolithiasis.  This is his first stone.  He underwent right URS/LL with stent insertion on 24 with stent removal on 24.  He is here to discuss 8 week renal US.  Denies any fever, chills, hematuria, dysuria, flank pain, nausea, or vomiting.  He does report some lower abdominal pain that is across abdomen and reports mild intermittent.             Subjective      Review of System:   As noted in HPI.    Past Medical History:   Past Medical History:   Diagnosis Date    Arthritis     Back pain     herniated disks    DDD (degenerative disc disease), lumbosacral     GERD (gastroesophageal reflux disease)     Hard of hearing     History of stress test     normal    [place] (hard of hearing)     NO AIDS WORN    [place] (hard of hearing)     Hypertension     Kidney stones     Lower back pain     Migraines     CAUSED BY INCREASED BP    PONV (postoperative nausea and vomiting)     Right knee injury     20-25 YEARS AGO       Past Surgical History:   Past Surgical History:   Procedure Laterality Date    COLONOSCOPY          COLONOSCOPY N/A 2018    Procedure: COLONOSCOPY WITH HOT FORCEP POLYPECTOMY;  Surgeon: Leonard Jenkins MD;  Location: [facility] ENDOSCOPY;  Service: Gastroenterology    INGUINAL HERNIA REPAIR Right 2018    Procedure: INGUINAL HERNIA  REPAIR WITH MESH;  Surgeon: Leonard Jenkins MD;  Location: [facility] OR;  Service: General    KNEE SURGERY Right     MENISCUS TEAR REPAIRED    URETEROSCOPY LASER LITHOTRIPSY WITH STENT INSERTION Right 3/20/2024    Procedure: URETEROSCOPY LASER LITHOTRIPSY, retro grade WITH STENT INSERTION;  Surgeon: Royal Calero  "MD Dung;  Location: [facility];  Service: Urology;  Laterality: Right;       Family History:   Family History   Problem Relation Age of Onset    Hypertension Mother     Arthritis Mother     Stroke Father        Social History:   Social History     Socioeconomic History    Marital status:    Tobacco Use    Smoking status: Never    Smokeless tobacco: Never   Vaping Use    Vaping status: Never Used   Substance and Sexual Activity    Alcohol use: No    Drug use: No    Sexual activity: Defer       Medications:     Current Outpatient Medications:     lisinopril (PRINIVIL,ZESTRIL) 20 MG tablet, Take 1 tablet by mouth Daily. Takes 20mg of the morning and 20mg at night (Patient taking differently: Take 1 tablet by mouth Daily.), Disp: 90 tablet, Rfl: 3    Allergies:   Allergies   Allergen Reactions    Codeine Sulfate Irritability       Objective     Physical Exam:   Vital Signs:   Vitals:    06/04/24 1511   BP: 128/64   Pulse: 61   Temp: 98.2 °F (36.8 °C)   SpO2: 97%   Weight: 93 kg (205 lb)   Height: 182.9 cm (72.01\")     Body mass index is 27.8 kg/m².   Physical Exam  Vitals and nursing note reviewed.   Constitutional:       General: He is awake. He is not in acute distress.     Appearance: He is not ill-appearing.      Comments: Accompanied by wife   Pulmonary:      Effort: Pulmonary effort is normal.   Skin:     General: Skin is warm and dry.   Neurological:      Mental Status: He is alert and oriented to person, place, and time. Mental status is at baseline.   Psychiatric:         Mood and Affect: Mood normal.         Behavior: Behavior is cooperative.            Labs  Brief Urine Lab Results  (Last result in the past 365 days)        Color   Clarity   Blood   Leuk Est   Nitrite   Protein   CREAT   Urine HCG        06/04/24 1528 Yellow   Clear   Negative   Negative   Negative   Trace                   Lab Results   Component Value Date    GLUCOSE 166 (H) 03/11/2024    CALCIUM 9.6 03/11/2024     " 03/11/2024    K 3.9 03/11/2024    CO2 24.0 03/11/2024     03/11/2024    BUN 22 03/11/2024    CREATININE 1.44 (H) 03/11/2024    EGFRIFAFRI 93 06/05/2017    EGFRIFNONA 83 12/18/2020    BCR 15.3 03/11/2024    ANIONGAP 11.0 03/11/2024       Lab Results   Component Value Date    WBC 8.90 03/11/2024    HGB 17.3 03/11/2024    HCT 49.6 03/11/2024    MCV 84.8 03/11/2024     03/11/2024            Lab Results   Component Value Date    PSA 0.556 06/05/2017       Office Visit on 06/04/2024   Component Date Value Ref Range Status    Color 06/04/2024 Yellow  Yellow, Straw, Dark Yellow, Ligia Final    Clarity, UA 06/04/2024 Clear  Clear Final    Specific Gravity  06/04/2024 1.020  1.005 - 1.030 Final    pH, Urine 06/04/2024 5.5  5.0 - 8.0 Final    Leukocytes 06/04/2024 Negative  Negative Final    Nitrite, UA 06/04/2024 Negative  Negative Final    Protein, POC 06/04/2024 Trace (A)  Negative mg/dL Final    Glucose, UA 06/04/2024 Negative  Negative mg/dL Final    Ketones, UA 06/04/2024 Negative  Negative Final    Urobilinogen, UA 06/04/2024 Normal  Normal, 0.2 E.U./dL Final    Bilirubin 06/04/2024 Negative  Negative Final    Blood, UA 06/04/2024 Negative  Negative Final    Lot Number 06/04/2024 [id number]   Final    Expiration Date 06/04/2024 10/21/2025   Final   Admission on 03/20/2024, Discharged on 03/20/2024   Component Date Value Ref Range Status    Stone Source 03/20/2024 Comment   Final    Right Kidney    Color 03/20/2024 Black   Final    Size 03/20/2024 3x3  mm Final    Single piece received.    Stone Weight 03/20/2024 21  mg Final    Composition 03/20/2024 Comment   Final    Percentage (Represents the % composition)    Ca Oxalate - Monohydrate, Stone 03/20/2024 70  % Final    Ca Oxalate-Dihydrate, Stone 03/20/2024 30  % Final    Photo 03/20/2024 Comment   Final    Photograph will follow under a separate cover    Comments: 03/20/2024 Comment   Final    Physician questions regarding [employer]  contact  [employer] at: [phone].    Please note 03/20/2024 Comment   Final    [facility] report will follow via computer, mail or   delivery.    Disclaimer: 03/20/2024 Comment   Final    This test was developed and its performance characteristics  determined by [employer].  It has not been cleared or approved  by the Food and Drug Administration.   Admission on 03/11/2024, Discharged on 03/11/2024   Component Date Value Ref Range Status    Glucose 03/11/2024 166 (H)  65 - 99 mg/dL Final    BUN 03/11/2024 22  8 - 23 mg/dL Final    Creatinine 03/11/2024 1.44 (H)  0.76 - 1.27 mg/dL Final    Sodium 03/11/2024 140  136 - 145 mmol/L Final    Potassium 03/11/2024 3.9  3.5 - 5.2 mmol/L Final    Chloride 03/11/2024 105  98 - 107 mmol/L Final    CO2 03/11/2024 24.0  22.0 - 29.0 mmol/L Final    Calcium 03/11/2024 9.6  8.6 - 10.5 mg/dL Final    Total Protein 03/11/2024 7.6  6.0 - 8.5 g/dL Final    Albumin 03/11/2024 4.7  3.5 - 5.2 g/dL Final    ALT (SGPT) 03/11/2024 12  1 - 41 U/L Final    AST (SGOT) 03/11/2024 23  1 - 40 U/L Final    Alkaline Phosphatase 03/11/2024 85  39 - 117 U/L Final    Total Bilirubin 03/11/2024 1.9 (H)  0.0 - 1.2 mg/dL Final    Globulin 03/11/2024 2.9  gm/dL Final    A/G Ratio 03/11/2024 1.6  g/dL Final    BUN/Creatinine Ratio 03/11/2024 15.3  7.0 - 25.0 Final    Anion Gap 03/11/2024 11.0  5.0 - 15.0 mmol/L Final    eGFR 03/11/2024 53.6 (L)  >60.0 mL/min/1.73 Final    Lipase 03/11/2024 43  13 - 60 U/L Final    Lactate 03/11/2024 2.2 (C)  0.5 - 2.0 mmol/L Final    Extra Tube 03/11/2024 Hold for add-ons.   Final    Auto resulted.    Extra Tube 03/11/2024 hold for add-on   Final    Auto resulted    Extra Tube 03/11/2024 Hold for add-ons.   Final    Auto resulted.    Extra Tube 03/11/2024 Hold for add-ons.   Final    Auto resulted    WBC 03/11/2024 8.90  3.40 - 10.80 10*3/mm3 Final    RBC 03/11/2024 5.85 (H)  4.14 - 5.80 10*6/mm3 Final    Hemoglobin 03/11/2024 17.3  13.0 - 17.7 g/dL Final    Hematocrit  03/11/2024 49.6  37.5 - 51.0 % Final    MCV 03/11/2024 84.8  79.0 - 97.0 fL Final    MCH 03/11/2024 29.6  26.6 - 33.0 pg Final    MCHC 03/11/2024 34.9  31.5 - 35.7 g/dL Final    RDW 03/11/2024 12.8  12.3 - 15.4 % Final    RDW-SD 03/11/2024 39.6  37.0 - 54.0 fl Final    MPV 03/11/2024 10.6  6.0 - 12.0 fL Final    Platelets 03/11/2024 166  140 - 450 10*3/mm3 Final    Neutrophil % 03/11/2024 79.2 (H)  42.7 - 76.0 % Final    Lymphocyte % 03/11/2024 14.3 (L)  19.6 - 45.3 % Final    Monocyte % 03/11/2024 5.2  5.0 - 12.0 % Final    Eosinophil % 03/11/2024 0.7  0.3 - 6.2 % Final    Basophil % 03/11/2024 0.4  0.0 - 1.5 % Final    Immature Grans % 03/11/2024 0.2  0.0 - 0.5 % Final    Neutrophils, Absolute 03/11/2024 7.05 (H)  1.70 - 7.00 10*3/mm3 Final    Lymphocytes, Absolute 03/11/2024 1.27  0.70 - 3.10 10*3/mm3 Final    Monocytes, Absolute 03/11/2024 0.46  0.10 - 0.90 10*3/mm3 Final    Eosinophils, Absolute 03/11/2024 0.06  0.00 - 0.40 10*3/mm3 Final    Basophils, Absolute 03/11/2024 0.04  0.00 - 0.20 10*3/mm3 Final    Immature Grans, Absolute 03/11/2024 0.02  0.00 - 0.05 10*3/mm3 Final    nRBC 03/11/2024 0.0  0.0 - 0.2 /100 WBC Final    Color, UA 03/11/2024 Yellow  Yellow, Straw Final    Appearance, UA 03/11/2024 Clear  Clear Final    pH, UA 03/11/2024 6.0  5.0 - 8.0 Final    Specific Gravity, UA 03/11/2024 >=1.030  1.005 - 1.030 Final    Glucose, UA 03/11/2024 Negative  Negative Final    Ketones, UA 03/11/2024 Negative  Negative Final    Bilirubin, UA 03/11/2024 Negative  Negative Final    Blood, UA 03/11/2024 Large (3+) (A)  Negative Final    Protein, UA 03/11/2024 Negative  Negative Final    Leuk Esterase, UA 03/11/2024 Negative  Negative Final    Nitrite, UA 03/11/2024 Negative  Negative Final    Urobilinogen, UA 03/11/2024 0.2 E.U./dL  0.2 - 1.0 E.U./dL Final    C-Reactive Protein 03/11/2024 <0.30  0.00 - 0.50 mg/dL Final    RBC, UA 03/11/2024 21-50 (A)  None Seen, 0-2 /HPF Final    WBC, UA 03/11/2024 None Seen  None  Seen, 0-2 /HPF Final    Urine culture not indicated.    Bacteria, UA 03/11/2024 None Seen  None Seen /HPF Final    Squamous Epithelial Cells, UA 03/11/2024 None Seen  None Seen, 0-2 /HPF Final    Hyaline Casts, UA 03/11/2024 None Seen  None Seen /LPF Final    Methodology 03/11/2024 Manual Light Microscopy   Final       Radiographic Studies  US Renal Bilateral    Result Date: 5/31/2024  Normal renal ultrasound except for benign, bilateral cysts..     This report was signed and finalized on 5/31/2024 4:28 PM by Nidia Colindres MD.      CT Abdomen Pelvis With Contrast    Result Date: 3/11/2024  4.5 mm mildly obstructing distal right ureteral calculus. Authenticated and Electronically Signed by Jimmy Arvizu M.D. on 03/11/2024 08:12:21 PM      I have reviewed the above labs and imaging.       Assessment / Plan      Assessment:   Diagnoses and all orders for this visit:    1. Nephrolithiasis (Primary)  -     XR abdomen kub; Future  -     POC Urinalysis Dipstick, Automated         68 y.o. male presents for follow up for nephrolithiasis.  This is his first stone.  He underwent right URS/LL with stent insertion on 03/20/24 with stent removal on 03/28/24.  Renal US was unremarkable and noted bilateral benign renal cysts.  Patient wishes to return in 1 year.  Will obtain KUB prior to appointment.  Discussed increasing water intake.     Plan:    KUB in1 year  Follow up in 1 year or sooner with KUB prior  Increase fluid intake to 2.5 liters per day    Follow Up:   Return in about 1 year (around 6/4/2025) for imaging prior, recheck with Venus.      VIVIANA Melendrez  [facility] [place]

## 2024-11-12 ENCOUNTER — LAB REQUISITION (OUTPATIENT)
Dept: LAB | Facility: HOSPITAL | Age: 69
End: 2024-11-12
Payer: MEDICARE

## 2024-11-12 DIAGNOSIS — R22.9 LOCALIZED SWELLING, MASS AND LUMP, UNSPECIFIED: ICD-10-CM

## 2024-11-12 PROCEDURE — 88305 TISSUE EXAM BY PATHOLOGIST: CPT | Performed by: PLASTIC SURGERY

## 2024-11-13 LAB
CYTO UR: NORMAL
LAB AP CASE REPORT: NORMAL
LAB AP CLINICAL INFORMATION: NORMAL
PATH REPORT.FINAL DX SPEC: NORMAL
PATH REPORT.GROSS SPEC: NORMAL

## 2024-12-09 ENCOUNTER — OFFICE VISIT (OUTPATIENT)
Dept: INTERNAL MEDICINE | Facility: CLINIC | Age: 69
End: 2024-12-09
Payer: MEDICARE

## 2024-12-09 VITALS
HEART RATE: 64 BPM | OXYGEN SATURATION: 99 % | BODY MASS INDEX: 27.77 KG/M2 | DIASTOLIC BLOOD PRESSURE: 86 MMHG | WEIGHT: 205 LBS | TEMPERATURE: 98 F | RESPIRATION RATE: 16 BRPM | HEIGHT: 72 IN | SYSTOLIC BLOOD PRESSURE: 168 MMHG

## 2024-12-09 DIAGNOSIS — I10 HTN (HYPERTENSION), BENIGN: Primary | ICD-10-CM

## 2024-12-09 DIAGNOSIS — R05.3 CHRONIC COUGH: ICD-10-CM

## 2024-12-09 DIAGNOSIS — R73.9 HYPERGLYCEMIA: ICD-10-CM

## 2024-12-09 DIAGNOSIS — E78.2 MIXED HYPERLIPIDEMIA: ICD-10-CM

## 2024-12-09 PROBLEM — Z12.11 SCREEN FOR COLON CANCER: Status: RESOLVED | Noted: 2018-07-31 | Resolved: 2024-12-09

## 2024-12-09 PROBLEM — T14.8XXA PUNCTURE WOUND: Status: RESOLVED | Noted: 2022-08-22 | Resolved: 2024-12-09

## 2024-12-09 PROCEDURE — 1126F AMNT PAIN NOTED NONE PRSNT: CPT | Performed by: INTERNAL MEDICINE

## 2024-12-09 PROCEDURE — 1170F FXNL STATUS ASSESSED: CPT | Performed by: INTERNAL MEDICINE

## 2024-12-09 PROCEDURE — G0439 PPPS, SUBSEQ VISIT: HCPCS | Performed by: INTERNAL MEDICINE

## 2024-12-09 PROCEDURE — 3079F DIAST BP 80-89 MM HG: CPT | Performed by: INTERNAL MEDICINE

## 2024-12-09 PROCEDURE — 99213 OFFICE O/P EST LOW 20 MIN: CPT | Performed by: INTERNAL MEDICINE

## 2024-12-09 PROCEDURE — 3077F SYST BP >= 140 MM HG: CPT | Performed by: INTERNAL MEDICINE

## 2024-12-09 RX ORDER — LISINOPRIL 20 MG/1
20 TABLET ORAL DAILY
Qty: 90 TABLET | Refills: 3 | Status: SHIPPED | OUTPATIENT
Start: 2024-12-09

## 2024-12-09 RX ORDER — TADALAFIL 10 MG/1
10 TABLET ORAL DAILY PRN
Qty: 30 TABLET | Refills: 1 | Status: SHIPPED | OUTPATIENT
Start: 2024-12-09

## 2024-12-09 NOTE — PROGRESS NOTES
The ABCs of the Annual Wellness Visit  Subsequent Medicare Wellness Visit    Subjective      Reji Corbin is a 69 y.o. male who presents for a Subsequent Medicare Wellness Visit.    Review of Systems   Constitutional: Negative.  Negative for activity change, appetite change, fatigue and fever.   HENT:  Negative for congestion, ear discharge, ear pain and trouble swallowing.    Eyes:  Negative for photophobia and visual disturbance.   Respiratory:  Negative for cough and shortness of breath.    Cardiovascular:  Negative for chest pain and palpitations.   Gastrointestinal:  Negative for abdominal distention, abdominal pain, constipation, diarrhea, nausea and vomiting.   Endocrine: Negative.    Genitourinary:  Negative for dysuria, hematuria and urgency.   Musculoskeletal:  Positive for arthralgias. Negative for back pain, joint swelling and myalgias.   Skin:  Negative for color change and rash.   Allergic/Immunologic: Negative.    Neurological:  Negative for dizziness, weakness, light-headedness and headaches.   Hematological:  Negative for adenopathy. Does not bruise/bleed easily.   Psychiatric/Behavioral:  Negative for agitation, confusion and dysphoric mood. The patient is not nervous/anxious.         The following portions of the patient's history were reviewed and   updated as appropriate: allergies, current medications, past family history, past medical history, past social history, past surgical history, and problem list.    Compared to one year ago, the patient feels his physical   health is the same.    Compared to one year ago, the patient feels his mental   health is the same.    Recent Hospitalizations:  He was not admitted to the hospital during the last year.       Current Medical Providers:  Patient Care Team:  Johny Mallory MD as PCP - General (Internal Medicine)  Leonard Jenkins MD as Consulting Physician (General Surgery)  Neris Mills APRN as Nurse Practitioner  "(Dermatology)    Outpatient Medications Prior to Visit   Medication Sig Dispense Refill    lisinopril (PRINIVIL,ZESTRIL) 20 MG tablet Take 1 tablet by mouth Daily. Takes 20mg of the morning and 20mg at night (Patient taking differently: Take 1 tablet by mouth Daily.) 90 tablet 3     No facility-administered medications prior to visit.       No opioid medication identified on active medication list. I have reviewed chart for other potential  high risk medication/s and harmful drug interactions in the elderly.        Aspirin is not on active medication list.  Aspirin use is not indicated based on review of current medical condition/s. Risk of harm outweighs potential benefits.  .    Patient Active Problem List   Diagnosis    HLD (hyperlipidemia)    Erectile dysfunction    Right inguinal hernia    Need for Td vaccine    Nephrolithiasis    HTN (hypertension), benign     Advance Care Planning  Advance Directive is not on file.  ACP discussion was held with the patient during this visit. Patient does not have an advance directive, information provided.     Objective    Vitals:    12/09/24 1031   BP: 168/86   Pulse: 64   Resp: 16   Temp: 98 °F (36.7 °C)   SpO2: 99%   Weight: 93 kg (205 lb)   Height: 182.9 cm (72\")   PainSc: 0-No pain     Estimated body mass index is 27.8 kg/m² as calculated from the following:    Height as of this encounter: 182.9 cm (72\").    Weight as of this encounter: 93 kg (205 lb).    Physical Exam  Constitutional:       General: He is not in acute distress.     Appearance: He is well-developed.   HENT:      Nose: Nose normal.   Eyes:      General: No scleral icterus.     Conjunctiva/sclera: Conjunctivae normal.   Neck:      Thyroid: No thyromegaly.      Trachea: No tracheal deviation.   Cardiovascular:      Rate and Rhythm: Normal rate and regular rhythm.      Heart sounds: No murmur heard.     No friction rub.   Pulmonary:      Effort: No respiratory distress.      Breath sounds: No wheezing or " rales.   Abdominal:      General: There is no distension.      Palpations: Abdomen is soft. There is no mass.      Tenderness: There is no abdominal tenderness. There is no guarding.   Musculoskeletal:         General: Deformity present. Normal range of motion.   Lymphadenopathy:      Cervical: No cervical adenopathy.   Skin:     General: Skin is warm and dry.      Findings: No erythema or rash.   Neurological:      Mental Status: He is alert and oriented to person, place, and time.      Cranial Nerves: No cranial nerve deficit.      Coordination: Coordination normal.      Deep Tendon Reflexes: Reflexes are normal and symmetric.   Psychiatric:         Behavior: Behavior normal.         Thought Content: Thought content normal.         Judgment: Judgment normal.              Does the patient have evidence of cognitive impairment?   No            HEALTH RISK ASSESSMENT    Smoking Status:  Social History     Tobacco Use   Smoking Status Never   Smokeless Tobacco Never     Alcohol Consumption:  Social History     Substance and Sexual Activity   Alcohol Use Never     Fall Risk Screen:    STEADI Fall Risk Assessment was completed, and patient is at LOW risk for falls.Assessment completed on:2024    Depression Screenin/9/2024    10:35 AM   PHQ-2/PHQ-9 Depression Screening   Little interest or pleasure in doing things Not at all   Feeling down, depressed, or hopeless Not at all   How difficult have these problems made it for you to do your work, take care of things at home, or get along with other people? Not difficult at all       Health Habits and Functional and Cognitive Screenin/9/2024    10:33 AM   Functional & Cognitive Status   Dental Exam Up to date   Eye Exam Up to date   Exercise (times per week) 7 times per week   Current Exercises Include Other;Walking   Do you need help using the phone?  No   Are you deaf or do you have serious difficulty hearing?  No   Do you need help to go to places  out of walking distance? No   Do you need help shopping? No   Do you need help preparing meals?  No   Do you need help with housework?  No   Do you need help with laundry? No   Do you need help taking your medications? No   Do you need help managing money? No   Do you ever drive or ride in a car without wearing a seat belt? No   Have you felt unusual stress, anger or loneliness in the last month? No   Who do you live with? Sibling   If you need help, do you have trouble finding someone available to you? No   Have you been bothered in the last four weeks by sexual problems? No   Do you have difficulty concentrating, remembering or making decisions? No       Age-appropriate Screening Schedule:  Refer to the list below for future screening recommendations based on patient's age, sex and/or medical conditions. Orders for these recommended tests are listed in the plan section. The patient has been provided with a written plan.    Health Maintenance   Topic Date Due    ZOSTER VACCINE (1 of 2) Never done    ANNUAL WELLNESS VISIT  07/07/2023    LIPID PANEL  07/07/2023    COVID-19 Vaccine (3 - 2024-25 season) 02/28/2025 (Originally 9/1/2024)    INFLUENZA VACCINE  03/31/2025 (Originally 7/1/2024)    Pneumococcal Vaccine 65+ (1 of 1 - PCV) 12/09/2025 (Originally 6/7/2022)    BMI FOLLOWUP  12/09/2025    COLORECTAL CANCER SCREENING  08/09/2032    TDAP/TD VACCINES (2 - Tdap) 08/22/2032    HEPATITIS C SCREENING  Completed                CMS Preventative Services Quick Reference  Risk Factors Identified During Encounter:    None Identified    The above risks/problems have been discussed with the patient.  Pertinent information has been shared with the patient in the After Visit Summary.    Diagnoses and all orders for this visit:    1. HTN (hypertension), benign (Primary) advised to follow readings at home says is much better control at home    2. Mixed hyperlipidemia discussed dietary restrictions check lipid profile  -      Comprehensive Metabolic Panel  -     Lipid Panel    3. Hyperglycemia  -     Hemoglobin A1c    4. Chronic cough ongoing for about 3 months has been on lisinopril for a few years now.  He is reluctant to change to an ARB.  If cough symptoms persist he is agreeable to switch over to another medication    Other orders  -     lisinopril (PRINIVIL,ZESTRIL) 20 MG tablet; Take 1 tablet by mouth Daily.  Dispense: 90 tablet; Refill: 3  -     tadalafil (Cialis) 10 MG tablet; Take 1 tablet by mouth Daily As Needed for Erectile Dysfunction.  Dispense: 30 tablet; Refill: 1        Follow Up:   Next Medicare Wellness visit to be scheduled in 1 year.      An After Visit Summary and PPPS were made available to the patient.

## 2024-12-10 LAB
ALBUMIN SERPL-MCNC: 4.3 G/DL (ref 3.5–5.2)
ALBUMIN/GLOB SERPL: 1.6 G/DL
ALP SERPL-CCNC: 87 U/L (ref 39–117)
ALT SERPL-CCNC: 10 U/L (ref 1–41)
AST SERPL-CCNC: 22 U/L (ref 1–40)
BILIRUB SERPL-MCNC: 1.3 MG/DL (ref 0–1.2)
BUN SERPL-MCNC: 20 MG/DL (ref 8–23)
BUN/CREAT SERPL: 16.4 (ref 7–25)
CALCIUM SERPL-MCNC: 9.4 MG/DL (ref 8.6–10.5)
CHLORIDE SERPL-SCNC: 106 MMOL/L (ref 98–107)
CHOLEST SERPL-MCNC: 256 MG/DL (ref 0–200)
CO2 SERPL-SCNC: 26.8 MMOL/L (ref 22–29)
CREAT SERPL-MCNC: 1.22 MG/DL (ref 0.76–1.27)
EGFRCR SERPLBLD CKD-EPI 2021: 65 ML/MIN/1.73
GLOBULIN SER CALC-MCNC: 2.7 GM/DL
GLUCOSE SERPL-MCNC: 84 MG/DL (ref 65–99)
HBA1C MFR BLD: 5.5 % (ref 4.8–5.6)
HDLC SERPL-MCNC: 35 MG/DL (ref 40–60)
LDLC SERPL CALC-MCNC: 187 MG/DL (ref 0–100)
POTASSIUM SERPL-SCNC: 4.6 MMOL/L (ref 3.5–5.2)
PROT SERPL-MCNC: 7 G/DL (ref 6–8.5)
SODIUM SERPL-SCNC: 142 MMOL/L (ref 136–145)
TRIGL SERPL-MCNC: 179 MG/DL (ref 0–150)
VLDLC SERPL CALC-MCNC: 34 MG/DL (ref 5–40)

## 2025-07-01 ENCOUNTER — TELEPHONE (OUTPATIENT)
Dept: SURGERY | Facility: CLINIC | Age: 70
End: 2025-07-01
Payer: MEDICARE

## 2025-07-01 NOTE — TELEPHONE ENCOUNTER
Called [facility] for 3 yr colon recall.Last colon 8/9/22. Mailed recall letter, routed to [facility] for open access.

## 2025-07-03 NOTE — TELEPHONE ENCOUNTER
S/w the pt's wife-pt was not available at this time-she stated she would have him call back when available.     *IF PT RETURNS THIS CALL, PLEASE SEND THE CALL TO THE OFFICE. ASK FOR AUDRA

## (undated) DEVICE — SUT SILK 2/0 SUTUPAK TIES 24IN SA75H

## (undated) DEVICE — ADHS LIQ MASTISOL 2/3ML

## (undated) DEVICE — JELLY,LUBE,STERILE,FLIP TOP,TUBE,2-OZ: Brand: MEDLINE

## (undated) DEVICE — GLV SURG SENSICARE W/ALOE PF LF 8.5 STRL

## (undated) DEVICE — KT ORCA VLV SXN AIR/H2O W/SEAL 1P/U STRL

## (undated) DEVICE — SUT VICRYL 3/0 CT1 27IN J258H

## (undated) DEVICE — NITINOL STONE RETRIEVAL BASKET: Brand: ZERO TIP

## (undated) DEVICE — SLV SCD CALF HEMOFORCE DVT THERP REPROC MD

## (undated) DEVICE — GLV SURG SENSICARE LT W/ALOE PF LF 7 STRL

## (undated) DEVICE — SUT PROLN 0 MO6 30IN 8418H

## (undated) DEVICE — DRSNG WND GZ PAD BORDERED LF 4X5IN STRL

## (undated) DEVICE — ST URO THERMEDX/FLUIDSMART IRR/FLD/WARM PNT/PRESS/300MMHG

## (undated) DEVICE — RICH CYSTO: Brand: MEDLINE INDUSTRIES, INC.

## (undated) DEVICE — SPNG GZ WOVN 4X4IN 12PLY 10/BX STRL

## (undated) DEVICE — NITINOL WIRE WITH HYDROPHILIC TIP: Brand: SENSOR

## (undated) DEVICE — FIBR LASR HOLMIUM SLIMLINE SIS EZ 365U

## (undated) DEVICE — NDL HYPO ECLPS SFTY 22G 1 1/2IN

## (undated) DEVICE — PAD GRND REM POLYHESIVE A/ DISP

## (undated) DEVICE — SYR LUERLOK 30CC

## (undated) DEVICE — SUT SILK 2/0 SH 30IN K833H

## (undated) DEVICE — ADAPT/Y SCPE GATEWAY ADVNTGE

## (undated) DEVICE — UNDYED BRAIDED (POLYGLACTIN 910), SYNTHETIC ABSORBABLE SUTURE: Brand: COATED VICRYL

## (undated) DEVICE — CUFF SCD HEMOFORCE SEQ CALF STD MD

## (undated) DEVICE — SOL IRR NACL 0.9PCT 3000ML

## (undated) DEVICE — DRN PENRS 3/4X18IN LTX

## (undated) DEVICE — Device

## (undated) DEVICE — STRIP,CLOSURE,WOUND,MEDI-STRIP,1/2X4: Brand: MEDLINE

## (undated) DEVICE — DUAL LUMEN URETERAL CATHETER

## (undated) DEVICE — ENDOGATOR AUXILIARY WATER JET CONNECTOR: Brand: ENDOGATOR

## (undated) DEVICE — VIOLET BRAIDED (POLYGLACTIN 910), SYNTHETIC ABSORBABLE SUTURE: Brand: COATED VICRYL

## (undated) DEVICE — FRCP BIOP RADLJAW4 HOT 2.2X240 BX40

## (undated) DEVICE — GW AMPLTZ SUPRSTF PTFE JB .035 7X145CM

## (undated) DEVICE — RICH MAJOR PROCEDURE: Brand: MEDLINE INDUSTRIES, INC.